# Patient Record
Sex: FEMALE | Race: OTHER | HISPANIC OR LATINO | Employment: UNEMPLOYED | URBAN - METROPOLITAN AREA
[De-identification: names, ages, dates, MRNs, and addresses within clinical notes are randomized per-mention and may not be internally consistent; named-entity substitution may affect disease eponyms.]

---
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
## 2019-01-02 ENCOUNTER — EVALUATION (OUTPATIENT)
Dept: SPEECH THERAPY | Facility: CLINIC | Age: 19
End: 2019-01-02
Payer: COMMERCIAL

## 2019-01-02 ENCOUNTER — EVALUATION (OUTPATIENT)
Dept: OCCUPATIONAL THERAPY | Facility: CLINIC | Age: 19
End: 2019-01-02
Payer: COMMERCIAL

## 2019-01-02 DIAGNOSIS — S06.0X0D CONCUSSION WITHOUT LOSS OF CONSCIOUSNESS, SUBSEQUENT ENCOUNTER: Primary | ICD-10-CM

## 2019-01-02 DIAGNOSIS — S06.0X0D CONCUSSION WITHOUT LOSS OF CONSCIOUSNESS, SUBSEQUENT ENCOUNTER: ICD-10-CM

## 2019-01-02 DIAGNOSIS — R48.8 OTHER SYMBOLIC DYSFUNCTIONS: Primary | ICD-10-CM

## 2019-01-02 DIAGNOSIS — R41.3 AMNESIA/MEMORY DISORDER: ICD-10-CM

## 2019-01-02 DIAGNOSIS — H53.8 BLURRED VISION: ICD-10-CM

## 2019-01-02 PROCEDURE — G8990 OTHER PT/OT CURRENT STATUS: HCPCS

## 2019-01-02 PROCEDURE — 96125 COGNITIVE TEST BY HC PRO: CPT | Performed by: SPEECH-LANGUAGE PATHOLOGIST

## 2019-01-02 PROCEDURE — 97166 OT EVAL MOD COMPLEX 45 MIN: CPT

## 2019-01-02 PROCEDURE — G9163 LANG EXPRESS GOAL STATUS: HCPCS | Performed by: SPEECH-LANGUAGE PATHOLOGIST

## 2019-01-02 PROCEDURE — G8991 OTHER PT/OT GOAL STATUS: HCPCS

## 2019-01-02 PROCEDURE — G9162 LANG EXPRESS CURRENT STATUS: HCPCS | Performed by: SPEECH-LANGUAGE PATHOLOGIST

## 2019-01-08 ENCOUNTER — APPOINTMENT (OUTPATIENT)
Dept: OCCUPATIONAL THERAPY | Facility: CLINIC | Age: 19
End: 2019-01-08
Payer: COMMERCIAL

## 2019-01-08 ENCOUNTER — APPOINTMENT (OUTPATIENT)
Dept: PHYSICAL THERAPY | Facility: CLINIC | Age: 19
End: 2019-01-08
Payer: COMMERCIAL

## 2019-01-10 ENCOUNTER — OFFICE VISIT (OUTPATIENT)
Dept: SPEECH THERAPY | Facility: CLINIC | Age: 19
End: 2019-01-10
Payer: COMMERCIAL

## 2019-01-10 ENCOUNTER — OFFICE VISIT (OUTPATIENT)
Dept: PHYSICAL THERAPY | Facility: CLINIC | Age: 19
End: 2019-01-10
Payer: COMMERCIAL

## 2019-01-10 DIAGNOSIS — S06.0X0D CONCUSSION WITHOUT LOSS OF CONSCIOUSNESS, SUBSEQUENT ENCOUNTER: Primary | ICD-10-CM

## 2019-01-10 DIAGNOSIS — S06.0X0D CONCUSSION WITHOUT LOSS OF CONSCIOUSNESS, SUBSEQUENT ENCOUNTER: ICD-10-CM

## 2019-01-10 DIAGNOSIS — R41.3 AMNESIA/MEMORY DISORDER: ICD-10-CM

## 2019-01-10 DIAGNOSIS — R48.8 OTHER SYMBOLIC DYSFUNCTIONS: Primary | ICD-10-CM

## 2019-01-10 PROCEDURE — 97112 NEUROMUSCULAR REEDUCATION: CPT | Performed by: PHYSICAL THERAPIST

## 2019-01-10 PROCEDURE — 97014 ELECTRIC STIMULATION THERAPY: CPT | Performed by: PHYSICAL THERAPIST

## 2019-01-10 PROCEDURE — 92507 TX SP LANG VOICE COMM INDIV: CPT

## 2019-01-10 PROCEDURE — 97110 THERAPEUTIC EXERCISES: CPT | Performed by: PHYSICAL THERAPIST

## 2019-01-10 NOTE — PROGRESS NOTES
Daily Speech Treatment Note    Today's date: 1/10/2019  Patients name: Spencer Morris  : 2000  MRN: [id number]  Safety measures: PCS  Referring provider: Jennifer Wiley PA-C    Primary Diagnosis/Billing code: B69 2  Secondary Diagnosis/ Billing code: R41 3, S06 0X0D    Visit Tracking:  -Referring provider: Epic  -Billing guidelines: AMA  -Visit #    Corry Curry  [facility]  (auth required after  visit)  -RE due 2019  Subjective/Behavioral:  -"My headache was a 10/10 all week  Right now I am a 9"    Patient arrived 15 min late to her speech appt  She missed her 4pm OT - she reports she did not know about that appt  Patient was provided with an updated schedule and the chance to r/s any appts that conflict with her job  Objective/Assessment:  -Reviewed testing results and goals in plan care with patient  Patient is in agreement at this time  Short-term goals:  1  Patient will be educated on word finding strategies (i e , circumlocution) for improved generative naming and verbal expression skills (to be achieved in 1-2 weeks)  2  Patient will name an average of 15+ items in a category in 60 seconds over 5 trials using compensatory strategies to facilitate improved word retrieval skills (to be achieved in 4-6 weeks)  3  Patient will name an average of 10+ words that begin with a specific letter in 60 seconds over 5 trials using compensatory strategies to facilitate improved word retrieval skills (to be achieved in 4-6 weeks)  4  Patient will complete word generation tasks (e g , synonyms/antonyms, analogies, category matrices, etc ) with 80% accuracy using word finding strategies to facilitate improved word retrieval skills (to be achieved in 4-6 weeks)  To target generative naming with initial letter and category provided, patient completed category members acitvity (i e , SPORT, begins with /g/)  Patient appropriately named 26/32 words; increasing to 32/32 with verbal cues      To target semantic association and lexicon building, patient asked to name an opposite/antonym and synonym of an abstract word (i e , rigid)  Opposite naming completed 18/21 opp, increased to 21/21 with verbal cues  Synonym naming completed 18/21 opp, increased to 20/21 with verbal cues  5  Patient will complete thought organization tasks (e g , sequencing, deduction puzzles, etc ) with 80% accuracy to facilitate increased executive functioning skills (to be achieved in 4-6 weeks)  Fill in blanks with recall: Patient was asked to fill in missing letters within a 10 word list of words within a category (i e , fruits; o_an_e (orange))  Patient completed ID of words in 16/20 opp  Patient was then educated on "chunking" memory strategy, and asked to use this strategy to study or memorize the two lists of words  Patient was provided with min-mod cues to create groups/chunking of words  Immediate recall completed in 14/20 opp  Plan:  -Continue with current plan of care

## 2019-01-10 NOTE — PROGRESS NOTES
Daily Note     Today's date: 1/10/2019  Patient name: Karen Todd  : 2000  MRN: [id number]  Referring provider: Odalys Durant PA-C  Dx:   Encounter Diagnosis     ICD-10-CM    1  Concussion without loss of consciousness, subsequent encounter S06 0X0D                   Subjective: Has 9/10 HA today  States that she has been experiencing 10/10 and nausea this past week, plans to take a medical leave from work  Objective: See treatment diary below    [employer] and roll tests neg B/L    Precautions none      Specialty Daily Treatment Diary      Manual   1/10            SOR 5 min                                                                          Exercise Diary   1/10           UT S  30" x 2            LS S  30" x 2            Chin tucks  5" 10x           Upright bike  L1, 6 min            VOR x 1  45" x 2 standing plain           VOR cx  45" x 2 standing plain           Ambulation with HT/HN  2 laps           FAEC - airex  30" x 2           Tandem gait  foam beams 2 laps                                                                                                                                                                           Modalities  1/10            Stim + MHP  10 min                                                  Assessment: Presented to PT session with high rating of HA which only decreased from 9/10 to 8/10 post modalities  HA continued to remain high throughout the session  Although pt had high HA and dizziness rating, pt may not be accurately rating sxs and appeared to tolerate session well  Reviewed and performed HEP with pt which pt reported good understanding  Due to c/o dizziness with supine<> sit, performed positional testing which was negative B/L  Patient would benefit from continued PT      Plan: Progress treatment as tolerated

## 2019-01-10 NOTE — PROGRESS NOTES
Daily Speech Treatment Note    Today's date: 1/10/2019 (***update)  Patients name: Sam Kwong  : 2000  MRN: [id number]  Safety measures: ***  Referring provider: Tessa Santana PA-C    Primary Diagnosis/Billing code: ***  Secondary Diagnosis/ Billing code: ***    Visit Tracking:  -Referring provider: Epic  -Billing guidelines: AMA  -Visit #  (***update)  -Auto  [facility]  (auth required after  visit)  -RE due 2019  Subjective/Behavioral:  -***    Objective/Assessment:  {ST note:56068}    Short-term goals:  1  Patient will be educated on word finding strategies (i e , circumlocution) for improved generative naming and verbal expression skills (to be achieved in 1-2 weeks)      2  Patient will name an average of 15+ items in a category in 60 seconds over 5 trials using compensatory strategies to facilitate improved word retrieval skills (to be achieved in 4-6 weeks)      3  Patient will name an average of 10+ words that begin with a specific letter in 60 seconds over 5 trials using compensatory strategies to facilitate improved word retrieval skills (to be achieved in 4-6 weeks)      4  Patient will complete word generation tasks (e g , synonyms/antonyms, analogies, category matrices, etc ) with 80% accuracy using word finding strategies to facilitate improved word retrieval skills (to be achieved in 4-6 weeks)      5  Patient will complete thought organization tasks (e g , sequencing, deduction puzzles, etc ) with 80% accuracy to facilitate increased executive functioning skills (to be achieved in 4-6 weeks)       Plan:  {RECOMMENDATIONS:33891}

## 2019-01-14 ENCOUNTER — OFFICE VISIT (OUTPATIENT)
Dept: PHYSICAL THERAPY | Facility: CLINIC | Age: 19
End: 2019-01-14
Payer: COMMERCIAL

## 2019-01-14 ENCOUNTER — APPOINTMENT (OUTPATIENT)
Dept: SPEECH THERAPY | Facility: CLINIC | Age: 19
End: 2019-01-14
Payer: COMMERCIAL

## 2019-01-14 ENCOUNTER — OFFICE VISIT (OUTPATIENT)
Dept: OCCUPATIONAL THERAPY | Facility: CLINIC | Age: 19
End: 2019-01-14
Payer: COMMERCIAL

## 2019-01-14 DIAGNOSIS — S06.0X0D CONCUSSION WITHOUT LOSS OF CONSCIOUSNESS, SUBSEQUENT ENCOUNTER: Primary | ICD-10-CM

## 2019-01-14 DIAGNOSIS — G43.709 CHRONIC MIGRAINE WITHOUT AURA WITHOUT STATUS MIGRAINOSUS, NOT INTRACTABLE: ICD-10-CM

## 2019-01-14 DIAGNOSIS — M54.2 NECK PAIN: ICD-10-CM

## 2019-01-14 DIAGNOSIS — M54.50 LOW BACK PAIN WITHOUT SCIATICA, UNSPECIFIED BACK PAIN LATERALITY, UNSPECIFIED CHRONICITY: ICD-10-CM

## 2019-01-14 DIAGNOSIS — R42 DIZZINESS AND GIDDINESS: ICD-10-CM

## 2019-01-14 PROCEDURE — 97112 NEUROMUSCULAR REEDUCATION: CPT | Performed by: PHYSICAL THERAPIST

## 2019-01-14 PROCEDURE — 97535 SELF CARE MNGMENT TRAINING: CPT

## 2019-01-14 PROCEDURE — 97110 THERAPEUTIC EXERCISES: CPT | Performed by: PHYSICAL THERAPIST

## 2019-01-14 NOTE — PROGRESS NOTES
Daily Note     Today's date: 2019  Patient name: Timothy Burnham  : 2000  MRN: [id number]  Referring provider: Radha Amador PA-C  Dx:   Encounter Diagnosis     ICD-10-CM    1  Concussion without loss of consciousness, subsequent encounter S06 0X0D    2  Dizziness and giddiness R42    3  Low back pain without sciatica, unspecified back pain laterality, unspecified chronicity M54 5    4  Neck pain M54 2    5  Chronic migraine without aura without status migrainosus, not intractable G43 709                   Subjective: Pt present with 7/10 HA today  Objective: See treatment diary below    Precautions none      Specialty Daily Treatment Diary      Manual   1/10  1/14          SOR 5 min   3 min                                                                        Exercise Diary   1/10  1/14         UT S  30" x 2            LS S  30" x 2            Chin tucks  5" 10x           Upright bike  L1, 6 min   L1, 10 min         VOR x 1  45" x 2 standing plain  45" x 2 standing plain         VOR cx  45" x 2 standing plain  45" x 2 standing plain         Ambulation with HT/HN  2 laps  2 laps         FAEC - airex  30" x 2  FTEC - airex 30" x 2         Tandem gait  foam beams 2 laps  foam beams 2 laps          Rockerboard   EO 30x A/P, M/L          Sidestepping   Foam beams 2 laps         Ambulation with turns    180 deg 2 laps ea                                                                                                                                Modalities  1/10  1/14          Stim + MHP  10 min             Stim + Ice   10 min                                     Assessment: Tolerated treatment fair  Able to tolerate full session but continues to report high HA and dizziness with all exercises  Most challenged by ambulation with HT/HN, demoing ataxic gait and often adducting LE in front of each other   Appeared hesitant and cautious while performing dynamic balance, appeared to require increased concentration for sequencing of movements pili during ambulation with 180 deg turns  Patient would benefit from continued PT      Plan: Progress treatment as tolerated

## 2019-01-14 NOTE — PROGRESS NOTES
Daily Note     Today's date: 2019  Patient name: Angelia Pressley  : 2000  MRN: [id number]  Referring provider: Roseanna Laughlin PA-C  Dx:   Encounter Diagnosis   Name Primary?  Concussion without loss of consciousness, subsequent encounter Yes                  Subjective: "I'm returning to work soon "      Objective: See treatment below  HA 8/10 upon arrival from PT  Dimmed [place] lights to reduce visual stimuli and applied MH to neck for HA reduction  Educated pt on use of blue occluder in home and work environments to reduce glare/improve screen tolerance  Multi-Matrix with completion of , visual closure, and visual clutter cards printed on blue paper, for improved saccades and visual scanning with visual clutter  3-letter word unscrambles requiring mental manipulation to sequence letters, for improved auditory processing  Upgraded to 4-letter unscrambles  HA increased to 9/10, so had pt sit in dark room with MH to neck for 5min to reduce HA  HA remained at 9/10 so ended session 10min early  Assessment: Tolerated treatment fair  100% accuracy  skills during Multi-Matrix with slow pace  100% accuracy sequencing 3-letters, 70% accuracy sequencing 4-letters and frequently requiring verbal cues to assist with sequencing  Plan: Continue skilled OT per POC        INTERVENTION COMMENTS:  Diagnosis: Concussion without loss of consciousness, subsequent encounter [S06 0X0D]  Precautions: N/A  FOTO: 14 with 86% limitation  Insurance: [address] [[id number]]  2 of 24 visits, PN due 2019

## 2019-01-15 DIAGNOSIS — G43.709 CHRONIC MIGRAINE WITHOUT AURA WITHOUT STATUS MIGRAINOSUS, NOT INTRACTABLE: ICD-10-CM

## 2019-01-15 DIAGNOSIS — S06.0X0D CONCUSSION WITHOUT LOSS OF CONSCIOUSNESS, SUBSEQUENT ENCOUNTER: ICD-10-CM

## 2019-01-15 RX ORDER — GABAPENTIN 300 MG/1
300 CAPSULE ORAL 3 TIMES DAILY
Qty: 60 CAPSULE | Refills: 0 | Status: SHIPPED | OUTPATIENT
Start: 2019-01-15 | End: 2019-03-14 | Stop reason: ALTCHOICE

## 2019-01-15 NOTE — TELEPHONE ENCOUNTER
Received fax from [address]  requesting gabapentin refill  Refill request entered  Pls sign off      Thanks

## 2019-01-17 ENCOUNTER — APPOINTMENT (OUTPATIENT)
Dept: SPEECH THERAPY | Facility: CLINIC | Age: 19
End: 2019-01-17
Payer: COMMERCIAL

## 2019-01-17 ENCOUNTER — TELEPHONE (OUTPATIENT)
Dept: NEUROLOGY | Facility: CLINIC | Age: 19
End: 2019-01-17

## 2019-01-17 ENCOUNTER — APPOINTMENT (OUTPATIENT)
Dept: PHYSICAL THERAPY | Facility: CLINIC | Age: 19
End: 2019-01-17
Payer: COMMERCIAL

## 2019-01-17 ENCOUNTER — APPOINTMENT (OUTPATIENT)
Dept: OCCUPATIONAL THERAPY | Facility: CLINIC | Age: 19
End: 2019-01-17
Payer: COMMERCIAL

## 2019-01-17 NOTE — PROGRESS NOTES
Daily Speech Treatment Note    Today's date: 2019  Patients name: Tori Olvera  : 2000  MRN: [id number]  Safety measures: PCS  Referring provider: Calvin Quintanilla PA-C    Primary Diagnosis/Billing code: P97 0  Secondary Diagnosis/ Billing code: R41 3, S06 0X0D    Visit Tracking:  -Referring provider: Epic  -Billing guidelines: AMA  -Visit #  ***  -Auto  [facility]  (auth required after  visit)  -RE due 2019  Subjective/Behavioral:  ***     Objective/Assessment:  -Reviewed patient's home exercises/activities completed since last appointment  ***    Short-term goals:  1  Patient will be educated on word finding strategies (i e , circumlocution) for improved generative naming and verbal expression skills (to be achieved in 1-2 weeks)  2  Patient will name an average of 15+ items in a category in 60 seconds over 5 trials using compensatory strategies to facilitate improved word retrieval skills (to be achieved in 4-6 weeks)  3  Patient will name an average of 10+ words that begin with a specific letter in 60 seconds over 5 trials using compensatory strategies to facilitate improved word retrieval skills (to be achieved in 4-6 weeks)  4  Patient will complete word generation tasks (e g , synonyms/antonyms, analogies, category matrices, etc ) with 80% accuracy using word finding strategies to facilitate improved word retrieval skills (to be achieved in 4-6 weeks)  5  Patient will complete thought organization tasks (e g , sequencing, deduction puzzles, etc ) with 80% accuracy to facilitate increased executive functioning skills (to be achieved in 4-6 weeks)  Plan:  -Continue with current plan of care
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
## 2019-01-21 ENCOUNTER — OFFICE VISIT (OUTPATIENT)
Dept: PHYSICAL THERAPY | Facility: CLINIC | Age: 19
End: 2019-01-21
Payer: COMMERCIAL

## 2019-01-21 ENCOUNTER — TELEPHONE (OUTPATIENT)
Dept: NEUROLOGY | Facility: CLINIC | Age: 19
End: 2019-01-21

## 2019-01-21 ENCOUNTER — OFFICE VISIT (OUTPATIENT)
Dept: SPEECH THERAPY | Facility: CLINIC | Age: 19
End: 2019-01-21
Payer: COMMERCIAL

## 2019-01-21 ENCOUNTER — OFFICE VISIT (OUTPATIENT)
Dept: OCCUPATIONAL THERAPY | Facility: CLINIC | Age: 19
End: 2019-01-21
Payer: COMMERCIAL

## 2019-01-21 DIAGNOSIS — S06.0X0D CONCUSSION WITHOUT LOSS OF CONSCIOUSNESS, SUBSEQUENT ENCOUNTER: Primary | ICD-10-CM

## 2019-01-21 DIAGNOSIS — M54.50 LOW BACK PAIN WITHOUT SCIATICA, UNSPECIFIED BACK PAIN LATERALITY, UNSPECIFIED CHRONICITY: ICD-10-CM

## 2019-01-21 DIAGNOSIS — M54.2 NECK PAIN: ICD-10-CM

## 2019-01-21 DIAGNOSIS — S06.0X0D CONCUSSION WITHOUT LOSS OF CONSCIOUSNESS, SUBSEQUENT ENCOUNTER: ICD-10-CM

## 2019-01-21 DIAGNOSIS — R48.8 OTHER SYMBOLIC DYSFUNCTIONS: Primary | ICD-10-CM

## 2019-01-21 DIAGNOSIS — R41.3 AMNESIA/MEMORY DISORDER: ICD-10-CM

## 2019-01-21 DIAGNOSIS — R42 DIZZINESS AND GIDDINESS: ICD-10-CM

## 2019-01-21 DIAGNOSIS — G43.709 CHRONIC MIGRAINE WITHOUT AURA WITHOUT STATUS MIGRAINOSUS, NOT INTRACTABLE: ICD-10-CM

## 2019-01-21 PROCEDURE — 97150 GROUP THERAPEUTIC PROCEDURES: CPT | Performed by: PHYSICAL THERAPIST

## 2019-01-21 PROCEDURE — 92507 TX SP LANG VOICE COMM INDIV: CPT | Performed by: SPEECH-LANGUAGE PATHOLOGIST

## 2019-01-21 PROCEDURE — 97535 SELF CARE MNGMENT TRAINING: CPT

## 2019-01-21 PROCEDURE — 97110 THERAPEUTIC EXERCISES: CPT | Performed by: PHYSICAL THERAPIST

## 2019-01-21 PROCEDURE — 97112 NEUROMUSCULAR REEDUCATION: CPT | Performed by: PHYSICAL THERAPIST

## 2019-01-21 NOTE — TELEPHONE ENCOUNTER
Received helen form  I lmom for pt to call office back to let her know about payment and turn around time  Fee would be $40  Form placed in clinical bin   Pt has an appt with gus 1/23
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
## 2019-01-22 ENCOUNTER — APPOINTMENT (OUTPATIENT)
Dept: OCCUPATIONAL THERAPY | Facility: CLINIC | Age: 19
End: 2019-01-22
Payer: COMMERCIAL

## 2019-01-22 ENCOUNTER — APPOINTMENT (OUTPATIENT)
Dept: PHYSICAL THERAPY | Facility: CLINIC | Age: 19
End: 2019-01-22
Payer: COMMERCIAL

## 2019-01-22 ENCOUNTER — APPOINTMENT (OUTPATIENT)
Dept: SPEECH THERAPY | Facility: CLINIC | Age: 19
End: 2019-01-22
Payer: COMMERCIAL

## 2019-01-22 NOTE — PROGRESS NOTES
Daily Speech Treatment Note    Today's date: 2019  Patients name: Palomo Pal  : 2000  MRN: [id number]  Safety measures: PCS  Referring provider: Gillian Leahy PA-C    Primary Diagnosis/Billing code: V14 4  Secondary Diagnosis/ Billing code: R41 3, S06 0X0D    Visit Tracking:  -Referring provider: Epic  -Billing guidelines: AMA  -Visit #3/24  ***  -Auto  [facility]  (auth required after  visit)  -RE due 2019  Subjective/Behavioral:  -***    Objective/Assessment:    Short-term goals:  1  Patient will be educated on word finding strategies (i e , circumlocution) for improved generative naming and verbal expression skills (to be achieved in 1-2 weeks)      2  Patient will name an average of 15+ items in a category in 60 seconds over 5 trials using compensatory strategies to facilitate improved word retrieval skills (to be achieved in 4-6 weeks)      3  Patient will name an average of 10+ words that begin with a specific letter in 60 seconds over 5 trials using compensatory strategies to facilitate improved word retrieval skills (to be achieved in 4-6 weeks)      4  Patient will complete word generation tasks (e g , synonyms/antonyms, analogies, category matrices, etc ) with 80% accuracy using word finding strategies to facilitate improved word retrieval skills (to be achieved in 4-6 weeks)  5  Patient will complete thought organization tasks (e g , sequencing, deduction puzzles, etc ) with 80% accuracy to facilitate increased executive functioning skills (to be achieved in 4-6 weeks)  Plan:  -Continue with current plan of care
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
## 2019-01-24 ENCOUNTER — OFFICE VISIT (OUTPATIENT)
Dept: NEUROLOGY | Facility: CLINIC | Age: 19
End: 2019-01-24
Payer: COMMERCIAL

## 2019-01-24 ENCOUNTER — OFFICE VISIT (OUTPATIENT)
Dept: PHYSICAL THERAPY | Facility: CLINIC | Age: 19
End: 2019-01-24
Payer: COMMERCIAL

## 2019-01-24 ENCOUNTER — OFFICE VISIT (OUTPATIENT)
Dept: SPEECH THERAPY | Facility: CLINIC | Age: 19
End: 2019-01-24
Payer: COMMERCIAL

## 2019-01-24 VITALS
DIASTOLIC BLOOD PRESSURE: 68 MMHG | SYSTOLIC BLOOD PRESSURE: 100 MMHG | BODY MASS INDEX: 31.54 KG/M2 | HEART RATE: 80 BPM | HEIGHT: 62 IN | WEIGHT: 171.4 LBS

## 2019-01-24 DIAGNOSIS — R51.9 WORSENING HEADACHES: Primary | ICD-10-CM

## 2019-01-24 DIAGNOSIS — S06.0X0D CONCUSSION WITHOUT LOSS OF CONSCIOUSNESS, SUBSEQUENT ENCOUNTER: Primary | ICD-10-CM

## 2019-01-24 DIAGNOSIS — R42 DIZZINESS AND GIDDINESS: ICD-10-CM

## 2019-01-24 DIAGNOSIS — G43.709 CHRONIC MIGRAINE WITHOUT AURA WITHOUT STATUS MIGRAINOSUS, NOT INTRACTABLE: ICD-10-CM
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
## 2019-01-24 DIAGNOSIS — R48.8 OTHER SYMBOLIC DYSFUNCTIONS: Primary | ICD-10-CM

## 2019-01-24 DIAGNOSIS — S06.0X0D CONCUSSION WITHOUT LOSS OF CONSCIOUSNESS, SUBSEQUENT ENCOUNTER: ICD-10-CM

## 2019-01-24 DIAGNOSIS — R41.3 AMNESIA/MEMORY DISORDER: ICD-10-CM

## 2019-01-24 PROBLEM — S06.0X0A CONCUSSION WITH NO LOSS OF CONSCIOUSNESS: Status: RESOLVED | Noted: 2018-05-28 | Resolved: 2019-01-24

## 2019-01-24 PROCEDURE — 97014 ELECTRIC STIMULATION THERAPY: CPT | Performed by: PHYSICAL THERAPIST

## 2019-01-24 PROCEDURE — 97112 NEUROMUSCULAR REEDUCATION: CPT | Performed by: PHYSICAL THERAPIST

## 2019-01-24 PROCEDURE — 99214 OFFICE O/P EST MOD 30 MIN: CPT | Performed by: PHYSICIAN ASSISTANT

## 2019-01-24 PROCEDURE — 97110 THERAPEUTIC EXERCISES: CPT | Performed by: PHYSICAL THERAPIST

## 2019-01-24 PROCEDURE — 92507 TX SP LANG VOICE COMM INDIV: CPT | Performed by: SPEECH-LANGUAGE PATHOLOGIST

## 2019-01-24 RX ORDER — PROCHLORPERAZINE MALEATE 10 MG
10 TABLET ORAL EVERY 6 HOURS PRN
Qty: 10 TABLET | Refills: 0 | Status: SHIPPED | OUTPATIENT
Start: 2019-01-24 | End: 2019-05-01

## 2019-01-24 RX ORDER — RIZATRIPTAN BENZOATE 10 MG/1
10 TABLET ORAL ONCE AS NEEDED
Qty: 9 TABLET | Refills: 0 | Status: SHIPPED | OUTPATIENT
Start: 2019-01-24 | End: 2019-03-14 | Stop reason: ALTCHOICE

## 2019-01-24 RX ORDER — CYPROHEPTADINE HYDROCHLORIDE 4 MG/1
4 TABLET ORAL
Qty: 30 TABLET | Refills: 0 | Status: SHIPPED | OUTPATIENT
Start: 2019-01-24 | End: 2019-03-14 | Stop reason: ALTCHOICE

## 2019-01-24 RX ORDER — OLANZAPINE 5 MG/1
5 TABLET ORAL
Qty: 5 TABLET | Refills: 0 | Status: SHIPPED | OUTPATIENT
Start: 2019-01-24 | End: 2019-03-14 | Stop reason: ALTCHOICE

## 2019-01-24 RX ORDER — DEXAMETHASONE 1 MG
1 TABLET ORAL
Qty: 5 TABLET | Refills: 0 | Status: SHIPPED | OUTPATIENT
Start: 2019-01-24 | End: 2019-01-28

## 2019-01-24 RX ORDER — LAMOTRIGINE 25 MG/1
TABLET ORAL
Qty: 120 TABLET | Refills: 3 | Status: SHIPPED | OUTPATIENT
Start: 2019-01-24 | End: 2019-03-14 | Stop reason: ALTCHOICE

## 2019-01-24 NOTE — ASSESSMENT & PLAN NOTE
Start Lamictal at bedtime  One tablet for 7 days, 2 tablets for 7 days, 3 tablets for 7 days and finally 4 tablets at bedtime  Continue gabapentin 300mg two times daily  At onset of migraine, take rizatriptan  May repeat in 2 hours if needed  Limit of 3 a week or 9 a month  If you take second dose of rizatriptan, take prochlorperazine 10 mg   Limit of 10 a month    Stay well hydrated, do not skip meals, and get plenty of rest     Continue with physical therapy for vestibular therapy  Occupational therapy can help with visual therapy which may help with the double vision  Speech therapy can be helpful for the memory issues  For next 5 days:   Take Dexamethasone 1 mg in am and Olanzapine at bedtime  For next 14 nights take cyproheptadine at bedtime then as needed for weather related headaches    Limit Ibuprofen to less than 3 doses a week  Please get MRI and blood work done

## 2019-01-24 NOTE — PATIENT INSTRUCTIONS
Start Lamictal at bedtime  One tablet for 7 days, 2 tablets for 7 days, 3 tablets for 7 days and finally 4 tablets at bedtime  Continue gabapentin 300mg two times daily  At onset of migraine, take rizatriptan  May repeat in 2 hours if needed  Limit of 3 a week or 9 a month  If you take second dose of rizatriptan, take prochlorperazine 10 mg   Limit of 10 a month    Stay well hydrated, do not skip meals, and get plenty of rest     Continue with physical therapy for vestibular therapy  Occupational therapy can help with visual therapy which may help with the double vision  Speech therapy can be helpful for the memory issues  For next 5 days: Take Dexamethasone 1 mg in am and Olanzapine at bedtime  For next 14 nights take cyproheptadine at bedtime then as needed for weather related headaches    Limit Ibuprofen to less than 3 doses a week  Please get MRI and blood work done    Neck pain:   - We discussed the role of her neck pathology and poor posture, with straightening of the normal cervical lordosis, in her headaches  We discussed how tightening of the neck muscles can irritate the nerves in the occipital region of her head and cause or worsen head pain  We also discussed and demonstrated neck strengthening and relaxation exercises, as well as giving written instructions on these exercises  - We talked about the importance of good posture for improving her shoulder, neck, and head pain  The patient was given visualization exercises for correcting posture, which she will practice at home  If this simple exercise does not help improve the posture, we will consider formal physical therapy in the future  Medication overuse headaches:   - We discussed medication overuse headache [facility]) and how to avoid it in the future  It was explained that all analgesics have the potential to cause medication overuse headache [facility]) and analgesic overuse can negate the effectiveness of headache preventive measures  After successful [address] treatment, preventive medications for an underlying primary headache disorder have a greater chance for success  Avoid medications with narcotics, barbiturates, or caffeine in them as these can cause rebound headaches after very few doses and can interfere with other headache medicine efficacy  Taking any analgesics for more than 2-3 days a week can cause medication overuse headache  Reproductive age women: Should take folic acid daily when taking anti-seizure drugs especially Depakote  Over-the-counter supplements: to decrease intensity and frequency of migraines  - Magnesium Oxide 400-500 mg a day  If any diarrhea or upset stomach, decrease dose  as tolerated  -  B2 200 mg twice a day  This supplement will change the color of the urine to fluorescent yellow no matter how hydrated, which is normal       Headache management instructions  - When patient has a moderate to severe headache, they should seek rest, initiate relaxation and apply cold compresses to the head  - Maintain regular sleep schedule  Adults need at least 7-8 hours of uninterrupted a night  - Limit over the counter medications such as Tylenol, Ibuprofen, Aleve, Excedrin  (No more than 3 times a week)  - Maintain headache diary  We discussed an DESTINY for a smart phone is "Migraine min"  - Limit caffeine to 1-2 cups 8 to 16 oz a day or less  - Avoid dietary trigger  (aged cheese, peanuts, MSG, aspartame and nitrates)  - Patient is to have regular frequent meals to prevent headache onset  - Please drink at least 64 ounces of water a day to help remain hydrated  Please call with any questions or concerns   Office number is [phone]

## 2019-01-24 NOTE — PROGRESS NOTES
Daily Speech Treatment Note    Today's date: 2019  Patients name: Jairo Szymanski  : 2000  MRN: [id number]  Safety measures: PCS  Referring provider: Felipe Klinefelter, PA-C    Primary Diagnosis/Billing code: P39 1  Secondary Diagnosis/ Billing code: R41 3, S06 0X0D    Visit Tracking:  -Referring provider: Epic  -Billing guidelines: AMA  -Visit #   -Auto  [facility]  (auth required after th visit)  -RE due 2019  Subjective/Behavioral:  -"I'm fine "    Patient f/u with neurology team this afternoon  Per patient report, she was started on a new medication for HAs, as well as recommended for MRI and blood work  Objective/Assessment:    Short-term goals:  1  Patient will be educated on word finding strategies (i e , circumlocution) for improved generative naming and verbal expression skills (to be achieved in 1-2 weeks)      2  Patient will name an average of 15+ items in a category in 60 seconds over 5 trials using compensatory strategies to facilitate improved word retrieval skills (to be achieved in 4-6 weeks)      3  Patient will name an average of 10+ words that begin with a specific letter in 60 seconds over 5 trials using compensatory strategies to facilitate improved word retrieval skills (to be achieved in 4-6 weeks)      4  Patient will complete word generation tasks (e g , synonyms/antonyms, analogies, category matrices, etc ) with 80% accuracy using word finding strategies to facilitate improved word retrieval skills (to be achieved in 4-6 weeks)  -Word finding: Patient presented with a list of words and asked to generate 3 synonyms for each word  Task completed in 48/66 opp (72%) independently, increasing to 59/66 opp (89% acc) with min semantic cues, increasing to 100% acc with additional min phonemic cues       5  Patient will complete thought organization tasks (e g , sequencing, deduction puzzles, etc ) with 80% accuracy to facilitate increased executive functioning skills (to be achieved in 4-6 weeks)  -Deductive reasoning/problem solving ([facility] 2, pg  273): Patient was presented with deductive reasoning puzzles and asked to solve  Puzzle #1 (3x4 grid) completed in 12/12 opp (100% acc) independently  -Deductive reasoning/problem solving ([facility] 2, pg  274): Patient was presented with deductive reasoning puzzles and asked to solve  Puzzle #2 (3x4 grid) completed in 3/12 opp (25% acc) independently, increasing to 100% acc with verbal cue to re-attempt puzzle  -Mental manipulation/organization: Clinician read 4 words aloud and patient asked to recall words in a sequential order (e g , [id number] = [id number])  Task completed in 10/18 opp (55%) independently, increasing to 14/18 opp (78% acc) with min verbal repetition cues  Plan:  -Patient was provided with home exercises/activities to target goals in plan of care at the end of today's session   -Continue with current plan of care  Patient was treated by Mala Louis, graduate SLP student, and was under the direct supervision of MADIHA Bee , CCC-SLP
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
## 2019-01-28 ENCOUNTER — APPOINTMENT (EMERGENCY)
Dept: RADIOLOGY | Facility: HOSPITAL | Age: 19
End: 2019-01-28
Payer: COMMERCIAL

## 2019-01-28 ENCOUNTER — HOSPITAL ENCOUNTER (EMERGENCY)
Facility: HOSPITAL | Age: 19
Discharge: HOME | End: 2019-01-28
Attending: EMERGENCY MEDICINE | Admitting: EMERGENCY MEDICINE
Payer: COMMERCIAL

## 2019-01-28 VITALS
HEART RATE: 89 BPM | OXYGEN SATURATION: 97 % | RESPIRATION RATE: 18 BRPM | SYSTOLIC BLOOD PRESSURE: 119 MMHG | DIASTOLIC BLOOD PRESSURE: 72 MMHG | TEMPERATURE: 98.4 F
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
## 2019-01-28 DIAGNOSIS — R51.9 ACUTE HEADACHE: ICD-10-CM

## 2019-01-28 DIAGNOSIS — S13.4XXA WHIPLASH: ICD-10-CM

## 2019-01-28 PROCEDURE — 99284 EMERGENCY DEPT VISIT MOD MDM: CPT

## 2019-01-28 PROCEDURE — 72040 X-RAY EXAM NECK SPINE 2-3 VW: CPT

## 2019-01-28 RX ORDER — ACETAMINOPHEN 500 MG
500 TABLET ORAL EVERY 6 HOURS PRN
Qty: 30 TABLET | Refills: 0 | Status: SHIPPED | OUTPATIENT
Start: 2019-01-28 | End: 2019-02-04

## 2019-01-28 RX ORDER — ACETAMINOPHEN 325 MG/1
975 TABLET ORAL ONCE AS NEEDED
Status: COMPLETED | OUTPATIENT
Start: 2019-01-28 | End: 2019-01-28

## 2019-01-28 RX ORDER — METHOCARBAMOL 500 MG/1
500 TABLET, FILM COATED ORAL 4 TIMES DAILY
Qty: 40 TABLET | Refills: 0 | Status: SHIPPED | OUTPATIENT
Start: 2019-01-28 | End: 2019-03-14 | Stop reason: ALTCHOICE

## 2019-01-28 RX ADMIN — ACETAMINOPHEN 975 MG: 325 TABLET, FILM COATED ORAL at 21:08

## 2019-01-29 NOTE — ED NOTES
Called x1 from [address] [facility] no answer        Oscar Antonio, MARLINE  01/28/19 8270

## 2019-01-29 NOTE — ED NOTES
Cervical collar discontinued per provider        [address] [place] [address], RN  01/28/19 6858

## 2019-01-29 NOTE — DISCHARGE INSTRUCTIONS
Tylenol as needed for pain  No sports or gym until you are cleared by your doctor/sports medicine  FU with your doctor in 1-3 days  Watch for sign of worsening head injury: vomiting, severe headache, somnolence, confusion, dizziness, if you see these signs return to the ED or return to your doctor sooner  Acute Headache, Ambulatory Care   GENERAL INFORMATION:   An acute headache  is pain or discomfort that starts suddenly and gets worse quickly  The cause of an acute headache may not be known  It may be triggered by stress, fatigue, hormones, food, or trauma  Common related symptoms include the following:   · Fever    · Sinus pressure    · Loss of memory    · Nausea or vomiting    · Problems with your vision, such as watery or red eyes, loss of vision, or pain in bright light    · Stiff neck    · Tenderness of the head and neck area    · Trouble staying awake, or being less alert than usual     · Weakness or less energy  Seek immediate care for the following symptoms:   · Severe pain    · A headache that occurs after a blow to the head, a fall, or other trauma     · Confusion or forgetfulness    · Numbness on one side of your face or body  Treatment for an acute headache  may include medicine to decrease pain  You may also need biofeedback or cognitive behavioral therapy  Ask your healthcare provider about these and other treatments for an acute headache  Manage my symptoms:   · Apply heat  on your head for 20 to 30 minutes every 2 hours for as many days as directed  Heat helps decrease pain and muscle spasms  You may alternate heat and ice  · Apply ice  on your head for 15 to 20 minutes every hour or as directed  Use an ice pack, or put crushed ice in a plastic bag  Cover it with a towel  Ice helps decrease pain  · Relax your muscles  Lie down in a comfortable position and close your eyes  Relax your muscles slowly  Start at your toes and work your way up your body      · Keep a record of your headaches  Write down when your headaches start and stop  Include your symptoms and what you were doing when the headache began  Record what you ate or drank for 24 hours before the headache started  Describe the pain and where it hurts  Keep track of what you did to treat your headache and whether it worked  Follow up with your healthcare provider as directed:  Bring your headache record with you when you see your healthcare provider  Write down your questions so you remember to ask them during your visits  CARE AGREEMENT:   You have the right to help plan your care  Learn about your health condition and how it may be treated  Discuss treatment options with your caregivers to decide what care you want to receive  You always have the right to refuse treatment  The above information is an  only  It is not intended as medical advice for individual conditions or treatments  Talk to your doctor, nurse or pharmacist before following any medical regimen to see if it is safe and effective for you  © 2014 [address] is for End User's use only and may not be sold, redistributed or otherwise used for commercial purposes  All illustrations and images included in CareNotes® are the copyrighted property of A D A M [employer] Inc  or [employer]  Cervical Sprain   WHAT YOU NEED TO KNOW:   A cervical sprain is a stretched or torn muscle or ligament in your neck  Ligaments are strong tissues that connect bones  Common causes of cervical sprains include a car accident, a fall, or a sports injury  DISCHARGE INSTRUCTIONS:   Seek care immediately if:   · You have pain or numbness from your shoulder down to your hand  · You have problems with your vision, hearing, or balance  · You feel confused or cannot concentrate  · You have problems with movement and strength  Contact your healthcare provider if:   · You have increased swelling or pain in your neck       · You have questions or concerns about your condition or care  Medicines: You may need any of the following:  · Acetaminophen  decreases pain and fever  It is available without a doctor's order  Ask how much to take and how often to take it  Follow directions  Read the labels of all other medicines you are using to see if they also contain acetaminophen, or ask your doctor or pharmacist  Acetaminophen can cause liver damage if not taken correctly  Do not use more than 4 grams (4,000 milligrams) total of acetaminophen in one day  · NSAIDs , such as ibuprofen, help decrease swelling, pain, and fever  This medicine is available with or without a doctor's order  NSAIDs can cause stomach bleeding or kidney problems in certain people  If you take blood thinner medicine, always ask your healthcare provider if NSAIDs are safe for you  Always read the medicine label and follow directions  · Muscle relaxers  help decrease pain and muscle spasms  · Prescription pain medicine  may be given  Ask your healthcare provider how to take this medicine safely  Some prescription pain medicines contain acetaminophen  Do not take other medicines that contain acetaminophen without talking to your healthcare provider  Too much acetaminophen may cause liver damage  Prescription pain medicine may cause constipation  Ask your healthcare provider how to prevent or treat constipation  · Take your medicine as directed  Contact your healthcare provider if you think your medicine is not helping or if you have side effects  Tell him or her if you are allergic to any medicine  Keep a list of the medicines, vitamins, and herbs you take  Include the amounts, and when and why you take them  Bring the list or the pill bottles to follow-up visits  Carry your medicine list with you in case of an emergency  Manage your symptoms:   · Apply heat  on your neck for 15 to 20 minutes, 4 to 6 times a day or as directed   Heat helps decrease pain, stiffness, and muscle spasms  · Begin gentle neck exercises  as soon as you can move your neck without pain  Exercises will help decrease stiffness and improve the strength and movement of your neck  Ask your healthcare provider what kind of exercises you should do  · Gradually return to your usual activities as directed  Stop if you have pain  Avoid activities that can cause more damage to your neck, such as heavy lifting or strenuous exercise  · Sleep without a pillow  to help decrease pain  Instead, roll a small towel tightly and place it under your neck  · Go to physical therapy as directed  A physical therapist teaches you exercises to help improve movement and strength, and to decrease pain  Prevent neck injury:   · Drive safely  Make sure everyone in your car wears a seatbelt  A seatbelt can save your life if you are in an accident  Do not use your cell phone when you are driving  This could distract you and cause an accident  Pull over if you need to make a call or send a text message  · Wear helmets, lifejackets, and protective gear  Always wear a helmet when you ride a bike or motorcycle, go skiing, or play sports that could cause a head injury  Wear protective equipment when you play sports  Wear a lifejacket when you are on a boat or doing water sports  Follow up with your healthcare provider as directed: You may be referred to an orthopedist or a physical therapist  Write down your questions so you remember to ask them during your visits  © 2017 [facility] Information is for End User's use only and may not be sold, redistributed or otherwise used for commercial purposes  All illustrations and images included in CareNotes® are the copyrighted property of [employer]  or [employer]  The above information is an  only  It is not intended as medical advice for individual conditions or treatments   Talk to your doctor, nurse or pharmacist before following any medical regimen to see if it is safe and effective for you  Motor Vehicle Accident   WHAT YOU NEED TO KNOW:   A motor vehicle accident (MVA) can cause injury from the impact or from being thrown around inside the car  You may have a bruise on your abdomen, chest, or neck from the seatbelt  You may also have pain in your face, neck, or back  You may have pain in your knee, hip, or thigh if your body hits the dash or the steering wheel  Muscle pain is commonly worse 1 to 2 days after an MVA  DISCHARGE INSTRUCTIONS:   Call 911 if:   · You have new or worsening chest pain or shortness of breath  Return to the emergency department if:   · You have new or worsening pain in your abdomen  · You have nausea and vomiting that does not get better  · You have a severe headache  · You have weakness, tingling, or numbness in your arms or legs  · You have new or worsening pain that makes it hard for you to move  Contact your healthcare provider if:   · You have pain that develops 2 to 3 days after the MVA  · You have questions or concerns about your condition or care  Medicines:   · Pain medicine: You may be given medicine to take away or decrease pain  Do not wait until the pain is severe before you take your medicine  · NSAIDs , such as ibuprofen, help decrease swelling, pain, and fever  This medicine is available with or without a doctor's order  NSAIDs can cause stomach bleeding or kidney problems in certain people  If you take blood thinner medicine, always ask if NSAIDs are safe for you  Always read the medicine label and follow directions  Do not give these medicines to children under 10months of age without direction from your child's healthcare provider  · Take your medicine as directed  Contact your healthcare provider if you think your medicine is not helping or if you have side effects  Tell him of her if you are allergic to any medicine   Keep a list of the medicines, vitamins, and herbs you take  Include the amounts, and when and why you take them  Bring the list or the pill bottles to follow-up visits  Carry your medicine list with you in case of an emergency  Follow up with your healthcare provider as directed:  Write down your questions so you remember to ask them during your visits  Safety tips:   · Always wear your seatbelt  This will help reduce serious injury from an MVA  · Use child safety seats  Your child needs to ride in a child safety seat made for his age, height, and weight  Ask your healthcare provider for more information about child safety seats  · Decrease speed  Drive the speed limit to reduce your risk for an MVA  · Do not drive if you are tired  You will react more slowly when you are tired  The slowed reaction time will increase your risk for an MVA  · Do not talk or text on your cell phone while you drive  You cannot respond fast enough in an emergency if you are distracted by texts or conversations  · Do not drink and drive  Use a designated   Call a taxi or get a ride home with someone if you have been drinking  Do not let your friends drive if they have been drinking alcohol  · Do not use illegal drugs and drive  You may be more tired or take risks that you normally would not take  Do not drive after you take prescription medicines that make you sleepy  Self-care:   · Use ice and heat  Ice helps decrease swelling and pain  Ice may also help prevent tissue damage  Use an ice pack, or put crushed ice in a plastic bag  Cover it with a towel and apply to your injured area for 15 to 20 minutes every hour, or as directed  After 2 days, use a heating pad on your injured area  Use heat as directed  · Gently stretch  Use gentle exercises to stretch your muscles after an MVA  Ask your healthcare provider for exercises you can do    © 2017 [employer] Information is for End User's use only and may not be sold, redistributed or otherwise used for commercial purposes  All illustrations and images included in CareNotes® are the copyrighted property of A [employer] M , Inc  or [employer]  The above information is an  only  It is not intended as medical advice for individual conditions or treatments  Talk to your doctor, nurse or pharmacist before following any medical regimen to see if it is safe and effective for you
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
## 2019-01-29 NOTE — ED NOTES
Found pt in waiting room with cervical collar removed  Re-applied cervical collar on pt       Vanessalcaus Navjot  01/28/19 2021

## 2019-01-30 ENCOUNTER — TELEPHONE (OUTPATIENT)
Dept: NEUROLOGY | Facility: CLINIC | Age: 19
End: 2019-01-30

## 2019-01-30 NOTE — PROGRESS NOTES
Daily Speech Treatment Note    Today's date: 2019 ***  Patients name: Angelia Pressley  : 2000  MRN: [id number]  Safety measures: PCS  Referring provider: Roseanna Laughlin PA-C    Primary Diagnosis/Billing code: E75 7  Secondary Diagnosis/ Billing code: R41 3, S06 0X0D    Visit Tracking:  -Referring provider: Epic  -Billing guidelines: AMA  -Visit #  ***  -[facility]  [facility]  (auth required after  visit)  -RE due 2019  Subjective/Behavioral:  ***  Objective/Assessment:    Short-term goals:  1  Patient will be educated on word finding strategies (i e , circumlocution) for improved generative naming and verbal expression skills (to be achieved in 1-2 weeks)      2  Patient will name an average of 15+ items in a category in 60 seconds over 5 trials using compensatory strategies to facilitate improved word retrieval skills (to be achieved in 4-6 weeks)  To target word retrieval skills, the patient participated in "Scattergories Categories" listing items in a category given different initial letters (e g  Things you wear - Devmelody Cheney)  Patient completed task in *** opp with ***% acc independently, increasing to ***/*** opp with ***% acc given semantic cues       3  Patient will name an average of 10+ words that begin with a specific letter in 60 seconds over 5 trials using compensatory strategies to facilitate improved word retrieval skills (to be achieved in 4-6 weeks)      4  Patient will complete word generation tasks (e g , synonyms/antonyms, analogies, category matrices, etc ) with 80% accuracy using word finding strategies to facilitate improved word retrieval skills (to be achieved in 4-6 weeks)  To target word finding strategies, the client completed an analogy activity ([employer] 5 9: 115) filling in the second half of the analogy  Patient completed task in ***/20 opp with ***% acc independently, increasing to ***/20 opp with ***% acc given semantic cues       5  Patient will complete thought organization tasks (e g , sequencing, deduction puzzles, etc ) with 80% accuracy to facilitate increased executive functioning skills (to be achieved in 4-6 weeks)  To target executive functioning skills, the patient completed a Word List Retention activity [facility] 10: Page 68) where the clinician read a list of five words and asked questions about their sequence (e g  What was the word after vest?)  Task completed in ***/10 opp with ***% acc independently, increasing to ***/10 opp with ***% accuracy after one repetition  To target executive functioning skills, the patient completed a Mental Manipulation activity [facility] 10: Page 175) where the clinician read a list of four words and asked the client to sequence them to make a sentence (e g  The, open, door, back)  Task completed in ***/20 opp with ***% acc independently, increasing to ***/20 opp with **% acc after one repetition  Plan:  -Patient was provided with home exercises/activities to target goals in plan of care at the end of today's session   -Continue with current plan of care  Patient was treated by Jaylon Seals, graduate SLP student, and was under the direct supervision of MADIHA Jackson , CCC-SLP
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
## 2019-01-30 NOTE — TELEPHONE ENCOUNTER
Please see encounter on 1/30/19 to f/u on forms  Pt was transferred to front to pay for forms  Offline nurse is working on forms

## 2019-01-30 NOTE — TELEPHONE ENCOUNTER
Pt scheduled with Phil sanches    Pt asking for a c/b from nurses asap, tried to call your line 3x could not get thru  Thanks!   Apolinar
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
## 2019-01-31 ENCOUNTER — TELEPHONE (OUTPATIENT)
Dept: NEUROLOGY | Facility: CLINIC | Age: 19
End: 2019-01-31

## 2019-01-31 ENCOUNTER — APPOINTMENT (OUTPATIENT)
Dept: SPEECH THERAPY | Facility: CLINIC | Age: 19
End: 2019-01-31
Payer: COMMERCIAL

## 2019-01-31 ENCOUNTER — APPOINTMENT (OUTPATIENT)
Dept: OCCUPATIONAL THERAPY | Facility: CLINIC | Age: 19
End: 2019-01-31
Payer: COMMERCIAL

## 2019-01-31 ENCOUNTER — APPOINTMENT (OUTPATIENT)
Dept: PHYSICAL THERAPY | Facility: CLINIC | Age: 19
End: 2019-01-31
Payer: COMMERCIAL

## 2019-01-31 NOTE — TELEPHONE ENCOUNTER
FYI: Pt did not return to work yet  Pt made aware that she needs to reschedule with Dr Nelly Mittal before forms can be completed  Appt rescheduled with Dr Nelly Mittal 3/26/19

## 2019-01-31 NOTE — TELEPHONE ENCOUNTER
Pt states that she missed her appt today and appt  rescheduled 3/26 w/ Dr Dayton Clemens Requesting sooner appt, if possible first week of Feb  Pt states that she needs fmla filled out asap  I did advised of the below again  Any other recommendation?           [phone]
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
## 2019-02-01 NOTE — TELEPHONE ENCOUNTER
pt called regarding her FMLA   she states that her job is requesting a letter stating why deadline needs to be extended  and estimated time when they may receive completed fmla as deadline for fmla completion 2/1       she did not return to work yet  she will be staying out until follow up with dr Veda Mabry unless we can't get her a sooner appt then  she would return sooner  she states that she can't be out of work that long  i was able to reschedule pt for monday 2/4 at 0930 with dr Veda Mabry  she is still requesting a letter     ok to generate letter?  can fax letter to   [phone]  [phone] call pt if ok to generate as she would also like to  a copy of letter

## 2019-02-01 NOTE — TELEPHONE ENCOUNTER
Ok to state she has appt with Dr Nelly Mittal on 2/4/19 and will be accessed at that time  2 week form turn around
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
## 2019-02-04 ENCOUNTER — OFFICE VISIT (OUTPATIENT)
Dept: NEUROLOGY | Facility: CLINIC | Age: 19
End: 2019-02-04
Payer: COMMERCIAL

## 2019-02-04 ENCOUNTER — OFFICE VISIT (OUTPATIENT)
Dept: SPEECH THERAPY | Facility: CLINIC | Age: 19
End: 2019-02-04
Payer: COMMERCIAL

## 2019-02-04 ENCOUNTER — OFFICE VISIT (OUTPATIENT)
Dept: PHYSICAL THERAPY | Facility: CLINIC | Age: 19
End: 2019-02-04
Payer: COMMERCIAL

## 2019-02-04 ENCOUNTER — TELEPHONE (OUTPATIENT)
Dept: NEUROLOGY | Facility: CLINIC | Age: 19
End: 2019-02-04

## 2019-02-04 ENCOUNTER — OFFICE VISIT (OUTPATIENT)
Dept: OCCUPATIONAL THERAPY | Facility: CLINIC | Age: 19
End: 2019-02-04
Payer: COMMERCIAL

## 2019-02-04 VITALS
HEART RATE: 84 BPM | SYSTOLIC BLOOD PRESSURE: 122 MMHG | DIASTOLIC BLOOD PRESSURE: 68 MMHG | WEIGHT: 175 LBS | BODY MASS INDEX: 32.2 KG/M2 | HEIGHT: 62 IN

## 2019-02-04 DIAGNOSIS — R48.8 OTHER SYMBOLIC DYSFUNCTIONS: Primary | ICD-10-CM

## 2019-02-04 DIAGNOSIS — S06.0X0D CONCUSSION WITHOUT LOSS OF CONSCIOUSNESS, SUBSEQUENT ENCOUNTER: Primary | ICD-10-CM

## 2019-02-04 DIAGNOSIS — G43.709 CHRONIC MIGRAINE WITHOUT AURA WITHOUT STATUS MIGRAINOSUS, NOT INTRACTABLE: ICD-10-CM

## 2019-02-04 DIAGNOSIS — G47.00 INSOMNIA, UNSPECIFIED TYPE: ICD-10-CM

## 2019-02-04 DIAGNOSIS — S06.0X0D CONCUSSION WITHOUT LOSS OF CONSCIOUSNESS, SUBSEQUENT ENCOUNTER: ICD-10-CM

## 2019-02-04 DIAGNOSIS — M54.50 LOW BACK PAIN WITHOUT SCIATICA, UNSPECIFIED BACK PAIN LATERALITY, UNSPECIFIED CHRONICITY: ICD-10-CM

## 2019-02-04 DIAGNOSIS — R41.3 MEMORY DIFFICULTY: ICD-10-CM

## 2019-02-04 DIAGNOSIS — R41.3 AMNESIA/MEMORY DISORDER: ICD-10-CM

## 2019-02-04 DIAGNOSIS — R51.9 CHRONIC DAILY HEADACHE: Primary | ICD-10-CM

## 2019-02-04 DIAGNOSIS — R42 DIZZINESS AND GIDDINESS: ICD-10-CM

## 2019-02-04 DIAGNOSIS — M54.2 NECK PAIN: ICD-10-CM

## 2019-02-04 DIAGNOSIS — F32.1 CURRENT MODERATE EPISODE OF MAJOR DEPRESSIVE DISORDER WITHOUT PRIOR EPISODE (HCC): ICD-10-CM

## 2019-02-04 PROCEDURE — 99354 PR PROLONGED SVC OUTPATIENT SETTING 1ST HOUR: CPT | Performed by: PSYCHIATRY & NEUROLOGY

## 2019-02-04 PROCEDURE — 99215 OFFICE O/P EST HI 40 MIN: CPT | Performed by: PSYCHIATRY & NEUROLOGY

## 2019-02-04 PROCEDURE — 92507 TX SP LANG VOICE COMM INDIV: CPT | Performed by: SPEECH-LANGUAGE PATHOLOGIST

## 2019-02-04 PROCEDURE — 97530 THERAPEUTIC ACTIVITIES: CPT

## 2019-02-04 PROCEDURE — 97014 ELECTRIC STIMULATION THERAPY: CPT | Performed by: PHYSICAL THERAPIST

## 2019-02-04 PROCEDURE — 97112 NEUROMUSCULAR REEDUCATION: CPT | Performed by: PHYSICAL THERAPIST

## 2019-02-04 PROCEDURE — 97150 GROUP THERAPEUTIC PROCEDURES: CPT | Performed by: PHYSICAL THERAPIST

## 2019-02-04 NOTE — TELEPHONE ENCOUNTER
MD Radha Hook             Could you please help her find a therapist/pyschologist that does Cognitive behavioral therapy covered by her insurance?
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
## 2019-02-04 NOTE — PROGRESS NOTES
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 as fund of knowledge are appropriate for age - except for during the test below otherwise was normal  Normal language and spontaneous speech  STANDARD ASSESSMENT OF CONCUSSION (Slude Strand 82)     1  Orientation (1 point for each correct) Total 5 points   Score   What month is it? 1   What is the date today? 1   What is the day of the week? 1   What year is it? 1   What time is it right now? (within 1 hour is correct) 1     2  Immediate memory (1 point for each): 5 words, 3 trials - total 15 points  finger, amilcar, blanket, lemon, insect    Alternates lists:  candle, paper, sugar, sandwich, wagon  baby, monkey, perfume, sunset, iron  elbow, apple, carpet, saddle, bubble  Jacket, arrow, pepper, cotton, movie  Dollar, honey, mirror, saddle, anchor       Trial 1 Trial 2 Trial 3   Word 1 1 1 1   Word 2 1 1 1   Word 3 1 1 1   Word 4 0 1 1   Word 5 1 1 1       3  Concentration:     a  Digits Backwards (1 point for each): 3, 4, 5, 6 digit span - Total 5 points    [][id number],  [][id number],  [][id number]     Alternate list:  ,  [address] [place] [address],  [][id number],  [][id number]  [address],  [][id number],  [][id number],  [][id number]  [] 296, []9851,  [][id number],  [][id number]     b   Months in REVERSE order (1 point for entire sequence correct)  (Dec, Nov, Oct, Sept, Aug, July, June, May, April, March, Feb, Jan) []       4  Delayed recall 5 minutes later  (1 point for each of the 5 words) - Total 5 points    Word 1 [x]   Word 2 []   Word 3 []   Word 4 []   Word 5 []         TOTALS 2/4/2019      Orientation (of 5) 5    Immediate memory (of 15) 14    Concentration (of 5) 0    Delayed recall (of 5) 1    Total (max 30) 20        Cranial Nerves:  II-visual fields full  Fundi appear normal bilaterally  III, IV, VI-Pupils were equal, round, and reactive to light and accomodation  Extraocular movements were full and conjugate without nystagmus  convergence 6 cm, conjugate gaze, normal smooth pursuits, normal saccades   V-facial sensation symmetric  VII-facial expression symmetric, intact forehead wrinkle, strong eye closure, symmetric smile    VIII-hearing grossly intact bilaterally   IX, X-palate elevation symmetric, no dysarthria  XI-shoulder shrug strength intact    XII-tongue protrusion midline  Motor Exam: symmetric bulk and tone throughout, no pronator drift  Power/strength 5/5 bilateral upper and lower extremities, no atrophy, fasciculations or abnormal movements noted  Sensory: grossly intact light touch in all extremities  Reflexes: brachioradialis 2+, biceps 2+, knee 2+, ankle 2+ bilaterally  No ankle clonus  Coordination: Finger nose finger intact bilaterally, no apparent dysmetria, ataxia or tremor noted  Gait: steady casual and tandem gait  Romberg Negative  Pertinent lab results:   Was told to get blood work done but has not done it      Imaging:   MRI brain with and without contrast scheduled for 02/07/2019  Noncontrast head CT 05/21/2018: Unremarkable    Was there an alternative explanation for the symptoms?   [] Yes (comorbid conditions: migraine, exacerbation of current concussion, anxiety, ADHD, etc)  [] No (concussion is the only likely cause for the current symptoms) Review of Systems:   ROS obtained by medical assistant Personally reviewed and updated if indicated  Review of Systems   Constitutional: Positive for fatigue  HENT: Negative  Eyes: Positive for visual disturbance (light,blurred)  Respiratory: Negative  Cardiovascular: Negative  Gastrointestinal: Positive for nausea  Endocrine: Negative  Genitourinary: Negative  Musculoskeletal: Positive for back pain and neck pain  Skin: Negative  Allergic/Immunologic: Negative  Neurological: Positive for dizziness, tremors, light-headedness and headaches (daily)  Hematological: Negative  Psychiatric/Behavioral: Positive for sleep disturbance (less)  All other systems reviewed and are negative  I have spent 70 minutes with Patient  today in which greater than 50% of this time was spent in counseling/coordination of care regarding Prognosis, Risks and benefits of tx options, Intructions for management, Importance of tx compliance, Risk factor reductions and Impressions        Author:  Azul Rendon MD   Fellowship trained Concussion Specialist

## 2019-02-04 NOTE — LETTER
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 groomed  No dysmorphic features  HEENT:  Normocephalic atraumatic  No meningismus  Oropharynx is clear and moist  No oral mucosal lesions  Chest:  Respirations regular and unlabored  Cardiovascular:  Regular rate, intact distal pulses  Distal extremities warm without palpable edema or tenderness, no observed significant swelling  Musculoskeletal:  Full range of motion  (see below under neurologic exam for evaluation of motor function and gait)   Skin:  warm and dry, not diaphoretic  No apparent birthmarks or stigmata of neurocutaneous disease  Psychiatric:  Depressed mood and affect       Neurological Examination:     Mental status/cognitive function:   Orientated to time, place and person  Recent and remote memory intact  Attention span and concentration as well as fund of knowledge are appropriate for age - except for during the test below otherwise was normal  Normal language and spontaneous speech  STANDARD ASSESSMENT OF CONCUSSION (Cecil Strand 82)     1  Orientation (1 point for each correct) Total 5 points   Score   What month is it? 1   What is the date today?  1   What is the day of the week? 1   What year is it? 1   What time is it right now? (within 1 hour is correct) 1     2  Immediate memory (1 point for each): 5 words, 3 trials - total 15 points  finger, amilcar, blanket, lemon, insect    Alternates lists:  candle, paper, sugar, sandwich, wagon  baby, monkey, perfume, sunset, iron  elbow, apple, carpet, saddle, bubble  Jacket, arrow, pepper, cotton, movie  Dollar, honey, mirror, saddle, anchor       Trial 1 Trial 2 Trial 3   Word 1 1 1 1   Word 2 1 1 1   Word 3 1 1 1   Word 4 0 1 1   Word 5 1 1 1       3  Concentration:     a  Digits Backwards (1 point for each): 3, 4, 5, 6 digit span - Total 5 points    []1096,  []98111,  [][id number]     Alternate list:  ,  [address] [place] [address],  []80535,  [][id number]  [facility],  []5714,  []79296,  [][id number]  [] 675, []0767,  []53936,  [][id number]     b  Months in REVERSE order (1 point for entire sequence correct)  (Dec, Nov, Oct, Sept, Aug, July, June, May, April, March, Feb, Jan) []       4  Delayed recall 5 minutes later  (1 point for each of the 5 words) - Total 5 points    Word 1 [x]   Word 2 []   Word 3 []   Word 4 []   Word 5 []         TOTALS 2/4/2019      Orientation (of 5) 5    Immediate memory (of 15) 14    Concentration (of 5) 0    Delayed recall (of 5) 1    Total (max 30) 20        Cranial Nerves:  II-visual fields full  Fundi appear normal bilaterally  III, IV, VI-Pupils were equal, round, and reactive to light and accomodation  Extraocular movements were full and conjugate without nystagmus  convergence 6 cm, conjugate gaze, normal smooth pursuits, normal saccades   V-facial sensation symmetric  VII-facial expression symmetric, intact forehead wrinkle, strong eye closure, symmetric smile    VIII-hearing grossly intact bilaterally   IX, X-palate elevation symmetric, no dysarthria  XI-shoulder shrug strength intact    XII-tongue protrusion midline  Motor Exam: symmetric bulk and tone throughout, no pronator drift   Power/strength 5/5 bilateral upper and lower extremities, no atrophy, fasciculations or abnormal movements noted  Sensory: grossly intact light touch in all extremities  Reflexes: brachioradialis 2+, biceps 2+, knee 2+, ankle 2+ bilaterally  No ankle clonus  Coordination: Finger nose finger intact bilaterally, no apparent dysmetria, ataxia or tremor noted  Gait: steady casual and tandem gait  Romberg Negative  Pertinent lab results:   Was told to get blood work done but has not done it      Imaging:   MRI brain with and without contrast scheduled for 02/07/2019  Noncontrast head CT 05/21/2018: Unremarkable    Was there an alternative explanation for the symptoms? [] Yes (comorbid conditions: migraine, exacerbation of current concussion, anxiety, ADHD, etc)  [] No (concussion is the only likely cause for the current symptoms)        Review of Systems:   ROS obtained by medical assistant Personally reviewed and updated if indicated  Review of Systems   Constitutional: Positive for fatigue  HENT: Negative  Eyes: Positive for visual disturbance (light,blurred)  Respiratory: Negative  Cardiovascular: Negative  Gastrointestinal: Positive for nausea  Endocrine: Negative  Genitourinary: Negative  Musculoskeletal: Positive for back pain and neck pain  Skin: Negative  Allergic/Immunologic: Negative  Neurological: Positive for dizziness, tremors, light-headedness and headaches (daily)  Hematological: Negative  Psychiatric/Behavioral: Positive for sleep disturbance (less)  All other systems reviewed and are negative  I have spent 70 minutes with Patient  today in which greater than 50% of this time was spent in counseling/coordination of care regarding Prognosis, Risks and benefits of tx options, Intructions for management, Importance of tx compliance, Risk factor reductions and Impressions        Author:  Timur Bender MD   Fellowship trained Concussion Specialist

## 2019-02-04 NOTE — PROGRESS NOTES
Daily Note     Today's date: 2019  Patient name: Jose Dowling  : 2000  MRN: [id number]  Referring provider: Amanda Shea PA-C  Dx:   Encounter Diagnosis   Name Primary?  Concussion without loss of consciousness, subsequent encounter Yes                  Subjective: "I really need to work on my memory "      Objective: See treatment below  1  Convergence Insufficiency Symptom Survey (CISS): 43/60    2  Concussion Cognitive Checklist:  *Patient indicated that she is experiencing difficulties in the following areas:    · Memory: Remembering what people have told you, Learning new things, Remembering the date/day of the week and Keeping track of your appointments    · Attention: Easily distracted, Keeping your attention/concentrating on a task, Dividing your attention (i e , multi-tasking) and Losing your train of thought    · Processing: Following directions    · Executive Functions: Organizing your thoughts and Planning daily tasks    · Communication: Word finding in conversation, Expressing thoughts and ideas fluently and Expressing thoughts and ideas into writing    · Visual: Losing spot on the page when reading, Poor screen tolerance with electronics and Eye strain/fatigue when reading    · Emotional: Awareness or control of your emotions, Increased anxiety and Sleep changes    · Increased Sensitivities to: Lighting, Noise, Movement and Crowds or busy environments     3   Contextual Memory Test (CMT): Pt reports has noticed a change in  memory, rates her memory capacity at 25%, if studied 20 objects for 90 seconds would recall 12 of them, would have recalled 20 of them pre injury, frequently forgets things that happened the day before 75% of the time, forgets important details 75% of the time, frequently forgets things people have told her 75% of the time, frequently forgets things that happened a few minutes ago 25% of the time, wouldnot remember facts about this form a week from now     IMMEDIATE RECALL:     score falls  in mild deficit range    DELAYED RECALL:   score falls in moderate deficit range   RECOGNITION:  10/20 with 2 confabulations resulting in score      4  [place] Cognitive Assessment Version 8 1 (MoCA V8 1)  Visuospatial/executive functionin/5  Naming:  3/3  Memory: 1st trial:  , 2nd trial:    Attention/concentration: 2  List of letters:   Serial Seven Subtraction:  3/3 w/ 0 errors  Language/sentence repetition:    Language Fluency:    Abstract/Correlational Thinkin  Delayed Recall:  0  Orientation:     Memory Index Score: 8/15  MoCA V1 8 1 Raw Score:  , MIS:  8/15, indicative of mild neurocognitive impairments  Assessment: Tolerated treatment well  HA sustained at a 7/10 during assessments  Able to tolerate about 1 hour of screen time        Plan: Continued skilled OT per POC    INTERVENTION COMMENTS:  Diagnosis: Concussion without loss of consciousness, subsequent encounter [S06 0X0D]  Precautions: N/A  FOTO:  4 of 24 visits, PN due 3/4

## 2019-02-04 NOTE — PROGRESS NOTES
Patient ID: Jae Rojo is a 22 y o  female  Assessment/Plan:    No problem-specific Assessment & Plan notes found for this encounter  {Assess/PlanSmartLinks:31111}       Subjective:    HPI    {[facility] HPI texts:99307}    {Common ambulatory SmartLinks:19199}         Objective:    Blood pressure 122/68, pulse 84, height 5' 2" (1 575 m), weight 79 4 kg (175 lb), last menstrual period 01/21/2019  Physical Exam    Neurological Exam      ROS:    Review of Systems   Constitutional: Positive for fatigue  HENT: Negative  Eyes: Positive for visual disturbance (light,blurred)  Respiratory: Negative  Cardiovascular: Negative  Gastrointestinal: Positive for nausea  Endocrine: Negative  Genitourinary: Negative  Musculoskeletal: Positive for back pain and neck pain  Skin: Negative  Allergic/Immunologic: Negative  Neurological: Positive for dizziness, tremors, light-headedness and headaches (daily)  Hematological: Negative  Psychiatric/Behavioral: Positive for sleep disturbance (less)  All other systems reviewed and are negative

## 2019-02-04 NOTE — LETTER
February 4, 2019     Patient: Sam Kwong   YOB: 2000   Date of Visit: 2/4/2019       To Whom it May Concern:    Sam Kwong is under my professional care  She was seen in my office on 2/4/2019  She may return to work on 2/5/19  I would recommend she return gradually, working 3 days per week for the next two weeks and then full time thereafter starting 2/18/19  If you have any questions or concerns, please don't hesitate to call           Sincerely,          Jenni Pérez MD        CC: No Recipients

## 2019-02-04 NOTE — PROGRESS NOTES
Daily Note     Today's date: 2019  Patient name: Renée Bates  : 2000  MRN: [id number]  Referring provider: Angie Mark PA-C  Dx:   Encounter Diagnosis     ICD-10-CM    1  Concussion without loss of consciousness, subsequent encounter S06 0X0D    2  Dizziness and giddiness R42    3  Low back pain without sciatica, unspecified back pain laterality, unspecified chronicity M54 5    4  Neck pain M54 2    5  Chronic migraine without aura without status migrainosus, not intractable G43 709                   Subjective: Pt has 6/10 HA today         Objective: See treatment diary below    Precautions none      Specialty Daily Treatment Diary      Manual   1/10  1/14          SOR 5 min   3 min                                                                        Exercise Diary   1/10  1/14  1/21  1/24  2/   UT S  30" x 2     30" x 2  30s x2     D/c to HEP     LS S  30" x 2     30" x 2  30s x2     D/c to HEP     Chin tucks  5" 10x    5" 10x  5s hod 10x     Upright bike  L1, 6 min   L1, 10 min  L1, 10 min     L3, 5 min    VOR x 1  45" x 2 standing plain  45" x 2 standing plain  45" x 2 standing plain  45" x 2 standing plain  45" x 2 standing busy   VOR cx  45" x 2 standing plain  45" x 2 standing plain  45" x 2 standing plain  45" x 2 standing plain  45" x 2 standing plain   Ambulation with HT/HN  2 laps  2 laps  2 laps   2 laps ea  2 laps    FAEC - airex  30" x 2  FTEC - airex 30" x 2 FTEC - airex 30" x 2  FTEC - airex 30" x 2  FTEC - airex 30" x 2   Tandem gait  foam beams 2 laps  foam beams 2 laps  fw/bw foam beams 2 laps   fw/bw foam beams 2 laps       Rockerboard   EO 30x A/P, M/L          Sidestepping   Foam beams 2 laps         Ambulation with turns    180 deg 2 laps ea   180 deg 2 laps ea   180 deg 2 laps ea   180 deg 2 laps ea    Tandem stance         30" x 2    Self ball toss         2 laps fw                                                                                             Modalities  1/10  1/14  1/21  1/24  2/4    Stim + MHP  10 min     10 min   10 min  10 min     Stim + Ice   10 min                           5:00-5:25: self directed  5:25-5:35: group   5:35-6:00: one on one       Assessment: Tolerated treatment fair  Overall demo improved balance compared to previous sessions, appeared to be more challenged with HN than HT with ambulation  No significant exacerbation of HA with exercises today  Patient would benefit from continued PT      Plan: Progress treatment as tolerated  Re-eval within 1-2 visits

## 2019-02-05 NOTE — TELEPHONE ENCOUNTER
Writer attempted to contact patient to discuss in network providers  Unable to leave message due to mailbox being full  Writer looked into patients insurance  Printed list of available providers  Will place in mail for patient  Will attempt to call patient again

## 2019-02-06 ENCOUNTER — TRANSCRIBE ORDERS (OUTPATIENT)
Dept: LAB | Facility: CLINIC | Age: 19
End: 2019-02-06

## 2019-02-06 ENCOUNTER — APPOINTMENT (OUTPATIENT)
Dept: LAB | Facility: CLINIC | Age: 19
End: 2019-02-06
Payer: COMMERCIAL

## 2019-02-06 DIAGNOSIS — R51.9 WORSENING HEADACHES: ICD-10-CM

## 2019-02-06 DIAGNOSIS — R51.9 CHRONIC DAILY HEADACHE: ICD-10-CM

## 2019-02-06 LAB
ALBUMIN SERPL BCP-MCNC: 3.7 G/DL (ref 3.5–5)
ALP SERPL-CCNC: 88 U/L (ref 46–384)
ALT SERPL W P-5'-P-CCNC: 27 U/L (ref 12–78)
ANION GAP SERPL CALCULATED.3IONS-SCNC: 11 MMOL/L (ref 4–13)
AST SERPL W P-5'-P-CCNC: 13 U/L (ref 5–45)
BILIRUB SERPL-MCNC: 0.3 MG/DL (ref 0.2–1)
BUN SERPL-MCNC: 14 MG/DL (ref 5–25)
CALCIUM SERPL-MCNC: 8.8 MG/DL (ref 8.3–10.1)
CHLORIDE SERPL-SCNC: 106 MMOL/L (ref 100–108)
CO2 SERPL-SCNC: 25 MMOL/L (ref 21–32)
CREAT SERPL-MCNC: 0.84 MG/DL (ref 0.6–1.3)
GFR SERPL CREATININE-BSD FRML MDRD: 101 ML/MIN/1.73SQ M
GLUCOSE SERPL-MCNC: 78 MG/DL (ref 65–140)
POTASSIUM SERPL-SCNC: 4.2 MMOL/L (ref 3.5–5.3)
PROT SERPL-MCNC: 7.2 G/DL (ref 6.4–8.2)
SODIUM SERPL-SCNC: 142 MMOL/L (ref 136–145)
TSH SERPL DL<=0.05 MIU/L-ACNC: 1.8 UIU/ML (ref 0.46–3.98)
VIT B12 SERPL-MCNC: 387 PG/ML (ref 100–900)

## 2019-02-06 PROCEDURE — 36415 COLL VENOUS BLD VENIPUNCTURE: CPT

## 2019-02-06 PROCEDURE — 82607 VITAMIN B-12: CPT

## 2019-02-06 PROCEDURE — 80053 COMPREHEN METABOLIC PANEL: CPT

## 2019-02-06 PROCEDURE — 84443 ASSAY THYROID STIM HORMONE: CPT

## 2019-02-07 NOTE — TELEPHONE ENCOUNTER
Attempted to contact 3x  Unable to leave voicemails  Sent therapy resources to patient home  Mailed unable to reach letter

## 2019-02-08 ENCOUNTER — TELEPHONE (OUTPATIENT)
Dept: NEUROLOGY | Facility: CLINIC | Age: 19
End: 2019-02-08
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
## 2019-02-08 NOTE — TELEPHONE ENCOUNTER
Pt called in regarding her forms, states that she was still not able to go to work, at her last appt with Dr Hitesh Castro she was advised to return to work part time  She states that she was told to stop her medications so this caused more headaches, dizziness and nausea  She wants to restart her medications the gabapentin and compazine  States she is planning to back on Monday  Please advise, and please also advise how we should fill out these forms? These forms are in the clinical bin and filled out from 1/31, they will have to be updated  Pt did pay for forms  See previous encounters for further info

## 2019-02-11 ENCOUNTER — OFFICE VISIT (OUTPATIENT)
Dept: OCCUPATIONAL THERAPY | Facility: CLINIC | Age: 19
End: 2019-02-11
Payer: COMMERCIAL

## 2019-02-11 ENCOUNTER — EVALUATION (OUTPATIENT)
Dept: PHYSICAL THERAPY | Facility: CLINIC | Age: 19
End: 2019-02-11
Payer: COMMERCIAL

## 2019-02-11 ENCOUNTER — OFFICE VISIT (OUTPATIENT)
Dept: SPEECH THERAPY | Facility: CLINIC | Age: 19
End: 2019-02-11
Payer: COMMERCIAL

## 2019-02-11 DIAGNOSIS — G43.709 CHRONIC MIGRAINE WITHOUT AURA WITHOUT STATUS MIGRAINOSUS, NOT INTRACTABLE: ICD-10-CM

## 2019-02-11 DIAGNOSIS — R48.8 OTHER SYMBOLIC DYSFUNCTIONS: Primary | ICD-10-CM

## 2019-02-11 DIAGNOSIS — M54.2 NECK PAIN: ICD-10-CM

## 2019-02-11 DIAGNOSIS — R41.3 AMNESIA/MEMORY DISORDER: ICD-10-CM

## 2019-02-11 DIAGNOSIS — S06.0X0D CONCUSSION WITHOUT LOSS OF CONSCIOUSNESS, SUBSEQUENT ENCOUNTER: ICD-10-CM

## 2019-02-11 DIAGNOSIS — M54.50 LOW BACK PAIN WITHOUT SCIATICA, UNSPECIFIED BACK PAIN LATERALITY, UNSPECIFIED CHRONICITY: ICD-10-CM

## 2019-02-11 DIAGNOSIS — S06.0X0D CONCUSSION WITHOUT LOSS OF CONSCIOUSNESS, SUBSEQUENT ENCOUNTER: Primary | ICD-10-CM

## 2019-02-11 DIAGNOSIS — R42 DIZZINESS AND GIDDINESS: ICD-10-CM

## 2019-02-11 PROCEDURE — 97014 ELECTRIC STIMULATION THERAPY: CPT | Performed by: PHYSICAL THERAPIST

## 2019-02-11 PROCEDURE — 97150 GROUP THERAPEUTIC PROCEDURES: CPT | Performed by: PHYSICAL THERAPIST

## 2019-02-11 PROCEDURE — 97112 NEUROMUSCULAR REEDUCATION: CPT | Performed by: PHYSICAL THERAPIST

## 2019-02-11 PROCEDURE — 92507 TX SP LANG VOICE COMM INDIV: CPT

## 2019-02-11 PROCEDURE — 97530 THERAPEUTIC ACTIVITIES: CPT

## 2019-02-11 NOTE — PROGRESS NOTES
Daily Speech Treatment Note    Today's date: 2019  Patients name: Elvin Ivory  : 2000  MRN: [id number]  Safety measures: PCS  Referring provider: Michael Romero PA-C    Primary Diagnosis/Billing code: J94 8  Secondary Diagnosis/ Billing code: R41 3, S06 [id number]    Visit Tracking:  -Referring provider: Epic  -Billing guidelines: AMA  -Visit #    [facility]  (auth required after  visit)  -RE due 2019  Subjective/Behavioral:  -Patient arrived 15 minutes late to appointment  reported traffic (coming from [facility])  HA 7/10 upon arrival; reporting it has been consistent all day  Objective/Assessment:  -Patient did not return HEP worksheets to today's session  Short-term goals:  1  Patient will be educated on word finding strategies (i e , circumlocution) for improved generative naming and verbal expression skills (to be achieved in 1-2 weeks)      2  Patient will name an average of 15+ items in a category in 60 seconds over 5 trials using compensatory strategies to facilitate improved word retrieval skills (to be achieved in 4-6 weeks)      3  Patient will name an average of 10+ words that begin with a specific letter in 60 seconds over 5 trials using compensatory strategies to facilitate improved word retrieval skills (to be achieved in 4-6 weeks)      4  Patient will complete word generation tasks (e g , synonyms/antonyms, analogies, category matrices, etc ) with 80% accuracy using word finding strategies to facilitate improved word retrieval skills (to be achieved in 4-6 weeks)  Patient able to answer analogies to improve generative word finding through online XO1 game in  opp independently  5  Patient will complete thought organization tasks (e g , sequencing, deduction puzzles, etc ) with 80% accuracy to facilitate increased executive functioning skills (to be achieved in 4-6 weeks)     -Deductive reasoning/[employer] from [employer] for [employer] (Schedule Planning): Pt able to fill in schedule (completed over 2 puzzles) based on written clues with the following accuracy:  Puzzle 1--> 80% acc indep; increasing to 100% with min verbal semantic cues  Puzzle 2--> 100% independently; extended processing time  Noted to self-correct 2x  Plan:  -Patient was provided with home exercises/activities to target goals in plan of care at the end of today's session   -Continue with current plan of care
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
## 2019-02-12 NOTE — PROGRESS NOTES
Daily Note     Today's date: 2019  Patient name: Marjorie Biswas  : 2000  MRN: [id number]  Referring provider: Derek Steele PA-C  Dx:   Encounter Diagnosis   Name Primary?  Concussion without loss of consciousness, subsequent encounter Yes                  Subjective: "I have to drive home "      Objective: See treatment below  Arrived from PT with an 8/10 HA  Dimmed lights while completing clipper magazine # 3 on dark blue paper  Completed 5 letter word unscrambles with clutter tiles  Assessment: Tolerated treatment fair  HA increased to a 9/10  CORONADO ended session 15 minutes early as patient was driving herself home  Plan: Continued skilled OT per POC      INTERVENTION COMMENTS:  Diagnosis: Concussion without loss of consciousness, subsequent encounter [S06 0X0D]  Precautions: NA  FOTO:  5 of 24 visits, PN due 3/

## 2019-02-13 NOTE — TELEPHONE ENCOUNTER
My official recommendations were for her to return to work the day after I saw her which would have been 2/5/19 and she should have gone into her work and discussed the plan I wrote out for gradual return:  I gave her two options: either to do half days for 2 weeks or to do partial weeks for 2 weeks - and she chose a 3 day week for 2 weeks  Basically she only has one week left of that if she has not returned to work yet

## 2019-02-13 NOTE — TELEPHONE ENCOUNTER
Second set of forms received on [address], they are the same leave/disability forms that were provided previously for Radha to complete (see encounter 1/30/19)  I do not see any accomodation form  Call placed to patient to clarify if she has a different accomodation form or if she is requesting that the same form in clinical bin (and in media under 2/12/19) be completed with schedule restrictions

## 2019-02-13 NOTE — TELEPHONE ENCOUNTER
I won't be providing her any forms  I returned her to work at my visit  Dr Ignacio Torrez instructed her to return with decreased hours

## 2019-02-14 ENCOUNTER — APPOINTMENT (OUTPATIENT)
Dept: PHYSICAL THERAPY | Facility: CLINIC | Age: 19
End: 2019-02-14
Payer: COMMERCIAL

## 2019-02-14 ENCOUNTER — APPOINTMENT (OUTPATIENT)
Dept: SPEECH THERAPY | Facility: CLINIC | Age: 19
End: 2019-02-14
Payer: COMMERCIAL
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
## 2019-02-15 NOTE — TELEPHONE ENCOUNTER
Patient dropped off forms to be filled out  Charges were dropped and accepted  Patient provided her employee ID number - [id number]  She specified the following accommodations must be included on the form - First 2 weeks is part time (3 days per week)  After 2 weeks, Sathya Mooney will be back to a set schedule (8 AM to 3:30 PM) for her PT, OT and ST therapy  Scanned forms under media tab in patients chart

## 2019-02-15 NOTE — TELEPHONE ENCOUNTER
Pt called back and states that the accommodations form is a different form than what we have  the accommodations she is asking for is 3 days a week for 2 weeks and then returning full time  she states that she was not able to go to work on 2/5  after appt with dr Maty Donald as her migraines were to bad and she did not go to work at all this week because of her migraines and because her employer said she should not return until they receive accommodations form  she states that the letter that you wrote was not good enough for her employer and they would need accommodations form completed before she could return to work  [phone]  she is also asking that we add a set schedule(she would need 8a-4p), she normally works 10:30-6:30  since she will still be attending  PT/OT and ST     she will drop off accommodations form to [place] office today  I called HR direct at [phone] and spoke to cirs  She states that accommodations form must be completed  The letter would be back up  Please advise if you are willing to fill out accommodations form  And for original FMLA form what date are we keeping her out of work to

## 2019-02-18 ENCOUNTER — APPOINTMENT (OUTPATIENT)
Dept: SPEECH THERAPY | Facility: CLINIC | Age: 19
End: 2019-02-18
Payer: COMMERCIAL

## 2019-02-18 ENCOUNTER — APPOINTMENT (OUTPATIENT)
Dept: PHYSICAL THERAPY | Facility: CLINIC | Age: 19
End: 2019-02-18
Payer: COMMERCIAL

## 2019-02-18 ENCOUNTER — APPOINTMENT (OUTPATIENT)
Dept: OCCUPATIONAL THERAPY | Facility: CLINIC | Age: 19
End: 2019-02-18
Payer: COMMERCIAL
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
## 2019-02-18 NOTE — TELEPHONE ENCOUNTER
Patient calling to check if her FMLA forms have been completed  Informed her that they are not complete and we will call her once they are finished

## 2019-02-18 NOTE — TELEPHONE ENCOUNTER
Ok to fill out forms as requested, thank you! murmurs                    Capillary refill normal    ABD:       Inspection soft, nondistended, nontender or no masses                   Extrem:   Pulses present 2+                  Inspection Warm and well perfused, No cyanosis, No clubbing and No edema                                       Psych:    Mental Status consistent with expectations based upon mood                 Gross Exam Normal    A complete review of all systems was done with no positive findings                     IMPRESSION:  Premature birth, GE reflux disease, chronic and recurrent pulmonary aspiration syndrome, reactive airway disease/asthma, seasonal allergic rhinitis, possible obstructive sleep apnea, clinically doing well from pulmonary standpoint       PLAN : Reassurance, refills were given for Pulmicort, new Suzette LC nebulizer unit setup was given, review asthma action plan based on the symptoms at this time. Patient has already received influenza vaccination for the season. Recommended watching for any symptoms suggestive of obstructive sleep apnea with the snoring, restless sleep, nightmares, night terrors, sweating a lot in sleep, daytime consequences of behavioral problems, if indeed that is the case child will need sleep study and a neck x-ray looking for obstructive sleep apnea, we will see the patient back in follow-up in 4-5 months.

## 2019-02-19 ENCOUNTER — APPOINTMENT (OUTPATIENT)
Dept: PHYSICAL THERAPY | Facility: CLINIC | Age: 19
End: 2019-02-19
Payer: COMMERCIAL

## 2019-02-19 NOTE — TELEPHONE ENCOUNTER
Forms completed  Please abdias dr Destini Lopez review and advise on section 1, question 1 and 2 (when pt can return to work) and please complete question 3  Please have dr Destini Lopez sign both form and please fax    Please also contact pt once faxed

## 2019-02-19 NOTE — TELEPHONE ENCOUNTER
Pt called re: medical accomodation form  She would like to work 4 hours a day part time shift for 2 weeks instead of 3 days a week for 2 weeks  Pls update form if Dr Maria Teresa Cm is agreeable

## 2019-02-21 ENCOUNTER — APPOINTMENT (OUTPATIENT)
Dept: PHYSICAL THERAPY | Facility: CLINIC | Age: 19
End: 2019-02-21
Payer: COMMERCIAL

## 2019-02-21 ENCOUNTER — APPOINTMENT (OUTPATIENT)
Dept: SPEECH THERAPY | Facility: CLINIC | Age: 19
End: 2019-02-21
Payer: COMMERCIAL

## 2019-02-21 ENCOUNTER — APPOINTMENT (OUTPATIENT)
Dept: OCCUPATIONAL THERAPY | Facility: CLINIC | Age: 19
End: 2019-02-21
Payer: COMMERCIAL
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
## 2019-02-21 NOTE — TELEPHONE ENCOUNTER
Patient called in at this inquiring if forms have been faxed  I spoke with Mayra Negrete, updated forms signed by Dr Loyda Morales and Mayra Negrete will fax now and upload completed copies into patient's chart

## 2019-02-22 NOTE — PROGRESS NOTES
Speech-Language Pathology Re-Evaluation/Discharge    Today's date: 2019  Patients name: Analisa Guajardo  : 2000  MRN: [id number]  Safety measures: PCS  Referring provider: Art Funes PA-C    Subjective comments: "I got stuck in traffic " (Patient arrived 20 minutes late to session ) (HA pain at start of session: 7/10)    Patient's goal(s): "to get back to normal"    Assessments    The Repeatable Battery for the Assessment of Neuropsychological Status (RBANS) is a brief, individually-administered assessment which measures attention, language, visuospatial/constructional abilities, and immediate & delayed memory  The RBANS is intended for use with adolescents to adults, ages 15 to 80 years  The following results were obtained during the administration of the assessment  Form: A    Cognitive Domain/Subtest: Index Score: Percentile Rank: Classification: IE: Status:   IMMEDIATE MEMORY 73 4%ile Borderline 62 IMPROVEMENT        1  List Learning ()          2  Story Memory (10/24)           VISUOSPATIAL/  CONSTRUCTIONAL 93 32%ile Average 79 IMPROVEMENT        3  Figure Copy ()          4  Line Orientation ()           LANGUAGE 72 3%ile Borderline 75 DECLINE        5  Picture Naming (7/10)          6  Semantic Fluency ()           ATTENTION 84 14%ile Borderline 55 IMPROVEMENT        7  Digit Span ()          8  Coding ()           DELAYED MEMORY 68 2%ile Extremely Low 49 IMPROVEMENT        9  List Recall (5/10)          10  List Recognition ()          11  Story Recall ()          12  Figure Recall ()           Sum of Index Scores:  390  320 IMPROVEMENT   Total Score:  72      Percentile: 3%ile      Classification: Borderline          IE indicates the scores from the initial evaluation (2018)  Form: B      *Patient named 14 concrete category members (animals) in 60 sec (norm=15+)   -- BELOW AVERAGE (6 items named on IE)    *Patient named 10 abstract category members (words beginning with letter 'm') in 60 sec (norm=10+)  -- AVERAGE (5 named in IE)    Goals    Short-term goals:   1  Patient will be educated on word finding strategies (i e , circumlocution) for improved generative naming and verbal expression skills (to be achieved in 1-2 weeks)  -- MET    2  Patient will name an average of 15+ items in a category in 60 seconds over 5 trials using compensatory strategies to facilitate improved word retrieval skills (to be achieved in 4-6 weeks)  -- PARTIALLY MET    3  Patient will name an average of 10+ words that begin with a specific letter in 60 seconds over 5 trials using compensatory strategies to facilitate improved word retrieval skills (to be achieved in 4-6 weeks)  --  MET    4  Patient will complete word generation tasks (e g , synonyms/antonyms, analogies, category matrices, etc ) with 80% accuracy using word finding strategies to facilitate improved word retrieval skills (to be achieved in 4-6 weeks)  -- MET    5  Patient will complete thought organization tasks (e g , sequencing, deduction puzzles, etc ) with 80% accuracy to facilitate increased executive functioning skills (to be achieved in 4-6 weeks)  -- MET    Long-term goals:  1  Patient will demonstrate adequate verbal expression during conversation without breakdowns or word finding deficits (to be achieved by discharge)  -- MET    2  Patient will demonstrate cognitive-communication skills consistent with age and education given use of compensatory strategies when needed to resume baseline activities and responsibilities in home, community, and work/school settings (to be achieved by discharge)  -- PARTIALLY MET      Impressions/Recommendations    Impressions: As evidenced on standardized testing, patient presents with mild cognitive-linguistic deficits c/b reduced immediate & short-term memory, language, verbal fluency, and attention skills   Patient has not been consistent with attendance in outpatient skilled Speech Therapy services--she has only attended 5 treatment sessions (plus 1 evaluation and today's re-evaluation) since December 2018  It is suspected that patients psychosocial stressors are contributing to her cognitive-linguistic dysfunction  It is recommended that patient follow-up with a neuropsychologist to receive further evaluation and treatment  Taken from [address] [place] [address] Dr note on 02/04/2019: "Patient reported that she was seen by Dr Geovanna Low this morning  Per patient report, Dr Geovanna Low indicated that she believes patient's symptoms are related to depression  Patient stated that she doesn't feel like she has depression  Per patient report, a  is to f/u with her re: potential behavior health services in the area  This clinician provided patient behavioral health contact information again, which was originally presented to her on the initial evaluation (01/02/2019)  Patient reported that Dr Geovanna Low stated that patient "doesn't have to continue with therapy [PT/OT/ST]" if she doesn't want to  When prompted by clinician, patient indicated that she does find ST "helpful" in that it "activates [her] brain"  Clinician educated patient that her re-evaluation for ST is due next week (on/around 02/13/2019) "    Recommendations:  -Patient to be discharged from outpatient skilled Speech Therapy services: Patient to be discharged to an independent Home Exercise Program  Patient has achieved maximum potential     Visit Tracking:  -Referring provider: Epic  -Billing guidelines: AMA  -Visit #7/24   -Auto  [facility]   [place] required after 24th visit)

## 2019-02-25 ENCOUNTER — APPOINTMENT (OUTPATIENT)
Dept: PHYSICAL THERAPY | Facility: CLINIC | Age: 19
End: 2019-02-25
Payer: COMMERCIAL

## 2019-02-25 ENCOUNTER — EVALUATION (OUTPATIENT)
Dept: SPEECH THERAPY | Facility: CLINIC | Age: 19
End: 2019-02-25
Payer: COMMERCIAL

## 2019-02-25 ENCOUNTER — OFFICE VISIT (OUTPATIENT)
Dept: OCCUPATIONAL THERAPY | Facility: CLINIC | Age: 19
End: 2019-02-25
Payer: COMMERCIAL

## 2019-02-25 DIAGNOSIS — R41.3 AMNESIA/MEMORY DISORDER: ICD-10-CM

## 2019-02-25 DIAGNOSIS — R48.8 OTHER SYMBOLIC DYSFUNCTIONS: Primary | ICD-10-CM

## 2019-02-25 DIAGNOSIS — S06.0X0D CONCUSSION WITHOUT LOSS OF CONSCIOUSNESS, SUBSEQUENT ENCOUNTER: Primary | ICD-10-CM

## 2019-02-25 DIAGNOSIS — S06.0X0D CONCUSSION WITHOUT LOSS OF CONSCIOUSNESS, SUBSEQUENT ENCOUNTER: ICD-10-CM

## 2019-02-25 PROCEDURE — 97535 SELF CARE MNGMENT TRAINING: CPT

## 2019-02-25 PROCEDURE — 96125 COGNITIVE TEST BY HC PRO: CPT | Performed by: SPEECH-LANGUAGE PATHOLOGIST

## 2019-02-28 ENCOUNTER — APPOINTMENT (OUTPATIENT)
Dept: PHYSICAL THERAPY | Facility: CLINIC | Age: 19
End: 2019-02-28
Payer: COMMERCIAL

## 2019-02-28 ENCOUNTER — OFFICE VISIT (OUTPATIENT)
Dept: OCCUPATIONAL THERAPY | Facility: CLINIC | Age: 19
End: 2019-02-28
Payer: COMMERCIAL

## 2019-02-28 ENCOUNTER — APPOINTMENT (OUTPATIENT)
Dept: OCCUPATIONAL THERAPY | Facility: CLINIC | Age: 19
End: 2019-02-28
Payer: COMMERCIAL

## 2019-02-28 ENCOUNTER — APPOINTMENT (OUTPATIENT)
Dept: SPEECH THERAPY | Facility: CLINIC | Age: 19
End: 2019-02-28
Payer: COMMERCIAL

## 2019-02-28 DIAGNOSIS — S06.0X0D CONCUSSION WITHOUT LOSS OF CONSCIOUSNESS, SUBSEQUENT ENCOUNTER: Primary | ICD-10-CM

## 2019-02-28 PROCEDURE — 97535 SELF CARE MNGMENT TRAINING: CPT

## 2019-02-28 NOTE — PROGRESS NOTES
Daily Note     Today's date: 2019  Patient name: Analisa Guajardo  : 2000  MRN: [id number]  Referring provider: Art Funes PA-C  Dx:   Encounter Diagnosis   Name Primary?  Concussion without loss of consciousness, subsequent encounter Yes                  Subjective: "Staring at that picture made my headache go up "      Objective: See treatment below  HA 6/10 upon arrival  Pt alphabetized list of 50 states at [facility] for board-to-table copy and working memory  I Spy at [facility] for sustained near-point convergence  Memory tiles game for improved immediate/delayed recall, grading task up from 24 to 40 tiles  HA 7/10 post treatment  Assessment: Tolerated treatment well  Min vc's provided during I Spy to assist with locating items  Error x1 alphabetizing states with fast pace  Pt demo F recall during memory tiles game  Plan: Continue skilled OT per POC        INTERVENTION COMMENTS:  Diagnosis: Concussion without loss of consciousness, subsequent encounter [S06 0X0D]  Precautions: NA  FOTO:  1 NK 93 ORXGRA, PN due 3/4

## 2019-03-04 NOTE — TELEPHONE ENCOUNTER
Well in my opinion the restrictions should have started 2/5/19 when I originally told her to go back to work and she at that time agreed    But I will give her this one last chance  If she does not return to work 3/5/19 (they can start then), I am no longer changing any of my recommendations or documentation

## 2019-03-04 NOTE — TELEPHONE ENCOUNTER
Received call from Maricruz Gill at [place] wanting to clarify when her restrictions were to start-The paperwork was filled out on 2/21/19, were they to start that day? Pt states that she is to return to work 3/5/19  Pt has not yet returned to work, planning on returning tomorrow- shall the restrictions start then?     Lily Peralta [phone]

## 2019-03-08 ENCOUNTER — OFFICE VISIT (OUTPATIENT)
Dept: URGENT CARE | Age: 19
End: 2019-03-08
Payer: COMMERCIAL

## 2019-03-08 VITALS
SYSTOLIC BLOOD PRESSURE: 122 MMHG | HEART RATE: 86 BPM | RESPIRATION RATE: 18 BRPM | OXYGEN SATURATION: 100 % | TEMPERATURE: 98.7 F | DIASTOLIC BLOOD PRESSURE: 70 MMHG

## 2019-03-08 DIAGNOSIS — J01.90 ACUTE SINUSITIS, RECURRENCE NOT SPECIFIED, UNSPECIFIED LOCATION: Primary | ICD-10-CM

## 2019-03-08 LAB — S PYO AG THROAT QL: NEGATIVE

## 2019-03-08 PROCEDURE — 87430 STREP A AG IA: CPT | Performed by: FAMILY MEDICINE

## 2019-03-08 PROCEDURE — 99214 OFFICE O/P EST MOD 30 MIN: CPT | Performed by: FAMILY MEDICINE

## 2019-03-08 PROCEDURE — 99284 EMERGENCY DEPT VISIT MOD MDM: CPT | Performed by: FAMILY MEDICINE

## 2019-03-08 PROCEDURE — G0383 LEV 4 HOSP TYPE B ED VISIT: HCPCS | Performed by: FAMILY MEDICINE

## 2019-03-08 RX ORDER — AMOXICILLIN 875 MG/1
875 TABLET, COATED ORAL 2 TIMES DAILY
Qty: 20 TABLET | Refills: 0 | Status: SHIPPED | OUTPATIENT
Start: 2019-03-08 | End: 2019-03-18

## 2019-03-09 NOTE — PATIENT INSTRUCTIONS
Rapid strep is negative  Will treat for sinus infection at this time  Pain in the throat may be coming from postnasal drip  Flonase and allergy medicine can help with symptoms  Nasal saline flushes or Neti pot can also be helpful  Tylenol Motrin as needed for pain or fever  Over-the-counter cough and cold medicine as needed  Follow up with family doctor if no improvement  Go to the ER with any worsening symptoms

## 2019-03-09 NOTE — PROGRESS NOTES
[facility]        NAME: Tori Olvera is a 22 y o  female  : 2000    MRN: [id number]  DATE: 2019  TIME: 9:36 PM    Assessment and Plan   Acute sinusitis, recurrence not specified, unspecified location [J01 90]  1  Acute sinusitis, recurrence not specified, unspecified location  amoxicillin (AMOXIL) 875 mg tablet    POCT rapid strepA         Patient Instructions     Patient Instructions   Rapid strep is negative  Will treat for sinus infection at this time  Pain in the throat may be coming from postnasal drip  Flonase and allergy medicine can help with symptoms  Nasal saline flushes or Neti pot can also be helpful  Tylenol Motrin as needed for pain or fever  Over-the-counter cough and cold medicine as needed  Follow up with family doctor if no improvement  Go to the ER with any worsening symptoms  Chief Complaint     Chief Complaint   Patient presents with    Sore Throat     sore throat x1 week with HA  History of Present Illness   Tori Olvera presents to the clinic c/o    This is a 65-year-old female here today with complaints of sore throat and headache  She states she has had symptoms for about 1 week  She does have some sinus pressure and congestion  She also complains of some ear pressure  She denies cough at this time  She states she has had felt feverish but has not checked her temperature  Last time she felt feverish was 2 nights ago  She has not been blowing her nose and has not been looking at discharge  Review of Systems   Review of Systems   Constitutional: Positive for activity change, chills, fatigue and fever  HENT: Positive for congestion, ear pain, sinus pressure, sinus pain and sore throat  Respiratory: Negative for cough  Neurological: Negative  Psychiatric/Behavioral: Negative            Current Medications     Long-Term Medications   Medication Sig Dispense Refill    cyproheptadine (PERIACTIN) 4 mg tablet Take 1 tablet (4 mg total) by mouth daily at bedtime Take nightly for 14 days then as needed 30 tablet 0    gabapentin (NEURONTIN) 300 mg capsule Take 1 capsule (300 mg total) by mouth 3 (three) times a day (Patient taking differently: Take 300 mg by mouth 2 (two) times a day  ) 60 capsule 0    lamoTRIgine (LaMICtal) 25 mg tablet Take 1 tablet for 7 days  Increase to 2 tablets for 7 days  3 tablets for 7 days  4 tablets to make 100 mg 120 tablet 3    magnesium oxide (MAG-OX) 400 mg Take 1 tablet (400 mg total) by mouth daily 90 tablet 3    methocarbamol (ROBAXIN) 500 mg tablet Take 1 tablet (500 mg total) by mouth 4 (four) times a day for 10 days 40 tablet 0    OLANZapine (ZyPREXA) 5 mg tablet Take 1 tablet (5 mg total) by mouth daily at bedtime 5 tablet 0    prochlorperazine (COMPAZINE) 10 mg tablet Take 1 tablet (10 mg total) by mouth every 6 (six) hours as needed for nausea or vomiting 10 tablet 0    rizatriptan (MAXALT) 10 MG tablet Take 1 tablet (10 mg total) by mouth once as needed for migraine for up to 1 dose May repeat in 2 hours if needed 9 tablet 0       Current Allergies     Allergies as of 03/08/2019    (No Known Allergies)            The following portions of the patient's history were reviewed and updated as appropriate: allergies, current medications, past family history, past medical history, past social history, past surgical history and problem list     Objective   /70 (BP Location: Left arm, Patient Position: Sitting)   Pulse 86   Temp 98 7 °F (37 1 °C) (Temporal)   Resp 18   LMP 02/24/2019   SpO2 100%        Physical Exam     Physical Exam   Constitutional: She is oriented to person, place, and time  She appears well-developed and well-nourished  HENT:   Right Ear: Hearing and tympanic membrane normal    Left Ear: Tympanic membrane normal    Mouth/Throat: Posterior oropharyngeal erythema present  Posterior pharynx mildly erythemic  No tonsillar exudate tonsils 1/4  Tenderness to palpate over the maxillary sinuses  Pulmonary/Chest: Effort normal and breath sounds normal  No stridor  She has no wheezes  She has no rhonchi  Neurological: She is oriented to person, place, and time  Skin: Skin is warm and dry  Psychiatric: She has a normal mood and affect  Her behavior is normal    Nursing note and vitals reviewed

## 2019-03-12 ENCOUNTER — OFFICE VISIT (OUTPATIENT)
Dept: OCCUPATIONAL THERAPY | Facility: CLINIC | Age: 19
End: 2019-03-12
Payer: COMMERCIAL

## 2019-03-12 DIAGNOSIS — S06.0X0D CONCUSSION WITHOUT LOSS OF CONSCIOUSNESS, SUBSEQUENT ENCOUNTER: Primary | ICD-10-CM

## 2019-03-12 PROCEDURE — 97535 SELF CARE MNGMENT TRAINING: CPT

## 2019-03-12 NOTE — PROGRESS NOTES
Daily Note     Today's date: 3/12/2019  Patient name: Lydia Gilbert  : 2000  MRN: [id number]  Referring provider: Dilan Cosme PA-C  Dx:   Encounter Diagnosis   Name Primary?  Concussion without loss of consciousness, subsequent encounter Yes                  Subjective: "I come here straight from work, and sometimes I get out of work late "      Objective: See treatment below  Pt arrived 30min late due to leaving work late  HA 7/10 throughout session  Initiated Visual and Attention Organize The Hour for temporal awareness, divided attention, and oculomotor control  Sequenced 9 tasks, and completed  (Rush Hour card 35)  Assessment: Tolerated treatment well  Required use of solutions side of rush hour card to solve  Plan: Continue skilled OT per POC        INTERVENTION COMMENTS:  Diagnosis: Concussion without loss of consciousness, subsequent encounter [S06 0X0D]  Precautions: NA  FOTO:   AXHGFH, PN due 3/4

## 2019-03-14 ENCOUNTER — OFFICE VISIT (OUTPATIENT)
Dept: FAMILY MEDICINE CLINIC | Facility: CLINIC | Age: 19
End: 2019-03-14

## 2019-03-14 ENCOUNTER — OFFICE VISIT (OUTPATIENT)
Dept: OCCUPATIONAL THERAPY | Facility: CLINIC | Age: 19
End: 2019-03-14
Payer: COMMERCIAL

## 2019-03-14 VITALS
SYSTOLIC BLOOD PRESSURE: 120 MMHG | TEMPERATURE: 97 F | HEIGHT: 62 IN | RESPIRATION RATE: 18 BRPM | BODY MASS INDEX: 31.83 KG/M2 | WEIGHT: 173 LBS | HEART RATE: 80 BPM | DIASTOLIC BLOOD PRESSURE: 80 MMHG

## 2019-03-14 DIAGNOSIS — K59.00 CONSTIPATION, UNSPECIFIED CONSTIPATION TYPE: ICD-10-CM

## 2019-03-14 DIAGNOSIS — E66.09 CLASS 1 OBESITY DUE TO EXCESS CALORIES WITHOUT SERIOUS COMORBIDITY WITH BODY MASS INDEX (BMI) OF 31.0 TO 31.9 IN ADULT: Primary | ICD-10-CM

## 2019-03-14 DIAGNOSIS — S06.0X0D CONCUSSION WITHOUT LOSS OF CONSCIOUSNESS, SUBSEQUENT ENCOUNTER: Primary | ICD-10-CM

## 2019-03-14 DIAGNOSIS — N92.1 MENORRHAGIA WITH IRREGULAR CYCLE: ICD-10-CM

## 2019-03-14 PROCEDURE — 97535 SELF CARE MNGMENT TRAINING: CPT | Performed by: OCCUPATIONAL THERAPIST

## 2019-03-14 PROCEDURE — 99213 OFFICE O/P EST LOW 20 MIN: CPT | Performed by: FAMILY MEDICINE

## 2019-03-14 RX ORDER — IBUPROFEN 600 MG/1
600 TABLET ORAL EVERY 6 HOURS PRN
Qty: 60 TABLET | Refills: 0 | Status: SHIPPED | OUTPATIENT
Start: 2019-03-14 | End: 2019-04-14 | Stop reason: SDUPTHER

## 2019-03-14 RX ORDER — SENNOSIDES 8.6 MG
1 TABLET ORAL
Qty: 14 TABLET | Refills: 0 | Status: SHIPPED | OUTPATIENT
Start: 2019-03-14 | End: 2019-09-13

## 2019-03-14 RX ORDER — DOCUSATE SODIUM 100 MG/1
100 CAPSULE, LIQUID FILLED ORAL 2 TIMES DAILY
Qty: 60 CAPSULE | Refills: 1 | Status: SHIPPED | OUTPATIENT
Start: 2019-03-14 | End: 2019-09-13

## 2019-03-14 NOTE — PATIENT INSTRUCTIONS
Acanthosis Nigricans   WHAT YOU NEED TO KNOW:   What is acanthosis nigricans (AN)? AN is a condition that causes dark, velvety patches to form on your skin  The patches form on your body folds and creases  The most common areas affected are the back of your neck, armpit, and groin  Your fingers, mouth, nipple area, bellybutton, face, or elbows may also be affected  Your symptoms may be mild or severe  The patches may itch or have an odor  Symptoms develop slowly  Over time, your skin may become thick and leathery  What increases my risk for AN? AN is more common in adults with darker skin  AN may be a sign of insulin resistance  This means your body cannot use insulin correctly  Insulin resistance can lead to diabetes  The following can also increase your risk:  · Obesity, diabetes, or cancer    · Certain medicines, such as steroids or birth control pills    · A family history of AN    · Polycystic ovary syndrome (PCOS), hypothyroidism, or an adrenal gland problem  How is AN diagnosed? Your healthcare provider may be able to diagnose AN by looking at your skin  He or she will ask when your skin changes started, and if the changes have gotten worse over time  Tell him or her if you are taking any medicines  If you are a woman, tell him or her if you have PCOS or take birth control pills  The provider may also ask if anyone in your family has AN or diabetes  You may need any of the following to find the cause of AN:  · Blood tests  may be used to check for medical problems that can cause AN  · A blood glucose test or hemoglobin A1c test  may be used to check the amount of glucose (sugar) in your blood  These tests may show if you have diabetes  · An x-ray  may be used to find medical problems that can cause AN  How is AN treated? The skin patches may not need treatment if they do not cause symptoms  Patches often fade when the cause of AN is treated   Your healthcare provider may stop or change your medicine if it is causing AN  You may need to lose weight  You may also need any of the following:  · Topical cream or gel medicines  may be prescribed  These medicines may make the patches lighter or softer  You may also be given antibiotic cream or soap to use on your skin  · Laser therapy  may be used to treat thick skin  What can I do to manage or prevent AN?   · Reach or maintain a healthy weight  AN skin patches often clear up after weight loss  Your healthcare provider can help you create a safe weight loss plan if you are overweight  · Eat healthy foods  Healthy foods can help control your blood sugar levels  Examples of healthy foods include fruits, vegetables, whole-grain breads, low-fat dairy products, beans, lean meats, and fish  Your healthcare provider or dietitian can help you create a healthy meal plan  · Exercise regularly  Exercise can help control your blood sugar level  Exercise can also help you manage your weight or lose weight  Try to get at least 30 minutes of exercise every day on most days of the week, or as directed  · Use gentle skin care products  Some skin care products can irritate your skin and make AN worse  Ask your healthcare provider which skin care products you should use  He or she may also be able to prescribe or recommend soaps if the patches have an odor  When should I contact my healthcare provider? · You have new or worsening symptoms  · You have questions or concerns about your condition or care  CARE AGREEMENT:   You have the right to help plan your care  Learn about your health condition and how it may be treated  Discuss treatment options with your caregivers to decide what care you want to receive  You always have the right to refuse treatment  The above information is an  only  It is not intended as medical advice for individual conditions or treatments   Talk to your doctor, nurse or pharmacist before following any medical regimen to see if it is safe and effective for you  © 2017 [employer] Information is for End User's use only and may not be sold, redistributed or otherwise used for commercial purposes  All illustrations and images included in CareNotes® are the copyrighted property of A D [employer] M [employer]  or [employer]

## 2019-03-14 NOTE — ASSESSMENT & PLAN NOTE
Obesity with Acanthosis nigricans:  BMI of 31 64     1  HbA1c ( 6/2018: 5 4): order HbA1c  2  Education about diet, exercise  Encouraging healthy lifestyle

## 2019-03-14 NOTE — PROGRESS NOTES
Tank Arce 2000 female MRN: [id number]    Family Medicine Acute Visit    ASSESSMENT/PLAN   Problem List Items Addressed This Visit        Other    Obesity - Primary     Obesity with Acanthosis nigricans:  BMI of 31 64     1  HbA1c ( 6/2018: 5 4): order HbA1c  2  Education about diet, exercise  Encouraging healthy lifestyle  Relevant Orders    HEMOGLOBIN A1C W/ EAG ESTIMATION    Menorrhagia with irregular cycle     Menorrhagia with irregular cycle: Janis history of ovarian cyst  * not sexually active    1  Order  -CBC rule out anemia  - abdominal pelvic ultrasound/ and transvaginal  -monitor the ovarian cyst  2  Ibuprofen 600 mg q6hrs p r n   3  Considering OCP to help regulate menses:  Patient unsure at this time will follow up after the ultrasound  4  Instructions to go to the ED if abdominal pain worsens      US abd: ( 11/19/2015)  A discrete left ovary is difficult to fully appreciate, however, in the region of the left ovary there is a 5 1 cm complex cystic lesion  This could be a hemorrhagic cyst for which a follow-up pelvic ultrasound in 6 weeks would be recommended           Relevant Medications    ibuprofen (MOTRIN) 600 mg tablet    Multiple Vitamins-Minerals (MULTIVITAMIN GUMMIES ADULTS) CHEW    Other Relevant Orders    CBC and Platelet    US abdomen and pelvis with transvaginal    US abdomen and pelvis with transvaginal    Constipation     Constipation:  Bowel movement every 3-4 days:  Likely secondary to diet    1  Start Colace 100 mg b i d   - encourage increase water, fiber  2  Senna HS for two weeks  3   Monitor symptoms         Relevant Medications    docusate sodium (COLACE) 100 mg capsule    senna (SENOKOT) 8 6 mg            Future Appointments   Date Time Provider Surjit Nicholas   3/19/2019  4:00 PM CLIFF Elias BE OT [address]   3/20/2019  4:30 PM CLIFF Viramontes OT [address]   3/26/2019  4:00 PM CLIFF Viramontes OT [id number] [facility] 3/28/2019  5:00 PM Anntheodorenoemy Richards CLIFF BE OT [address]   4/18/2019  2:00 PM Silverio Suggs MD [facility]   4/23/2019  3:00 PM Adonay Bowie MD NEURO CTR [facility]          SUBJECTIVE  CC: Abdominal Pain (left side/constipation)      HPI:  Palomo Pal is a 22 y o  female who presents for acute visit:      1  Painful menses:  Abd pain: monthly episodes ( 2 weeks): usually one week before menses  - Pain: 7/10   Characteristic: aching pain, increases with movement and increases when BM, urinating  - Most of the time is : Left lower quadrant     Medication:  Ibuprofen 200mg BID QD   - heating pads dont help      Menses: at times 2x a month: for the past year   - lasting about 6-7 days each   - changes tampons 6-7x a day: about every 2 hours   - Not sexually active   - Have never tried OCP      Review of Systems   Constitutional: Negative for activity change, appetite change, fatigue and fever  HENT: Negative for congestion  Eyes: Negative for visual disturbance  Respiratory: Negative for cough, choking, shortness of breath and wheezing  Cardiovascular: Negative for chest pain, palpitations and leg swelling  Gastrointestinal: Positive for abdominal pain and constipation  Negative for abdominal distention, diarrhea, nausea and vomiting  Endocrine: Negative for polyuria  Genitourinary: Positive for menstrual problem and vaginal bleeding  Negative for decreased urine volume, difficulty urinating, dyspareunia, dysuria, enuresis, flank pain, frequency, pelvic pain, urgency, vaginal discharge and vaginal pain  Musculoskeletal: Negative for back pain  Skin: Negative for rash  Neurological: Negative for dizziness, tremors, seizures, syncope, facial asymmetry, speech difficulty, weakness, light-headedness, numbness and headaches  Psychiatric/Behavioral: Negative for confusion  The patient is not nervous/anxious              Historical Information   The patient history was reviewed and updated as follows:  Past Medical History:   Diagnosis Date    Depression     last assessed-9/5/2014    Finger injury     last assessed-8/16/2014    Myalgia     and myositis    No known health problems     Polyuria     last assessed-11/15/2013    Viral gastroenteritis     last assessed-3/4/2013         Past Surgical History:   Procedure Laterality Date    NO PAST SURGERIES       Family History   Problem Relation Age of Onset    Hypertension Mother     Cancer Maternal Grandmother     Cancer Maternal Grandfather       Social History   Social History     Substance and Sexual Activity   Alcohol Use No     Social History     Substance and Sexual Activity   Drug Use No     Social History     Tobacco Use   Smoking Status Never Smoker   Smokeless Tobacco Never Used       Medications:     Current Outpatient Medications:     amoxicillin (AMOXIL) 875 mg tablet, Take 1 tablet (875 mg total) by mouth 2 (two) times a day for 10 days, Disp: 20 tablet, Rfl: 0    docusate sodium (COLACE) 100 mg capsule, Take 1 capsule (100 mg total) by mouth 2 (two) times a day for 30 days, Disp: 60 capsule, Rfl: 1    ibuprofen (MOTRIN) 600 mg tablet, Take 1 tablet (600 mg total) by mouth every 6 (six) hours as needed for mild pain, moderate pain or headaches for up to 30 days, Disp: 60 tablet, Rfl: 0    magnesium oxide (MAG-OX) 400 mg, Take 1 tablet (400 mg total) by mouth daily (Patient not taking: Reported on 3/14/2019), Disp: 90 tablet, Rfl: 3    Multiple Vitamins-Minerals (MULTIVITAMIN GUMMIES ADULTS) CHEW, Chew 1 tablet daily for 90 days, Disp: 90 tablet, Rfl: 3    prochlorperazine (COMPAZINE) 10 mg tablet, Take 1 tablet (10 mg total) by mouth every 6 (six) hours as needed for nausea or vomiting (Patient not taking: Reported on 3/14/2019), Disp: 10 tablet, Rfl: 0    senna (SENOKOT) 8 6 mg, Take 1 tablet (8 6 mg total) by mouth daily at bedtime for 14 days, Disp: 14 tablet, Rfl: 0    No Known Allergies    OBJECTIVE  Vitals:   Vitals:    03/14/19 1635   BP: 120/80   Pulse: 80   Resp: 18   Temp: (!) 97 °F (36 1 °C)   Weight: 78 5 kg (173 lb)   Height: 5' 2" (1 575 m)         Physical Exam   Constitutional: She is oriented to person, place, and time  She appears well-developed and well-nourished  No distress  HENT:   Head: Normocephalic  Eyes: Pupils are equal, round, and reactive to light  Neck: Normal range of motion  Cardiovascular: Normal rate and regular rhythm  No murmur heard  Pulmonary/Chest: Effort normal and breath sounds normal    Abdominal: Soft  Bowel sounds are normal  She exhibits no distension and no mass  There is no tenderness  There is no rebound and no guarding  No hernia  Musculoskeletal: Normal range of motion  Neurological: She is alert and oriented to person, place, and time  Skin: Skin is warm  Capillary refill takes less than 2 seconds  She is not diaphoretic  Dark pigmentation:  Neck   Psychiatric: Thought content normal  Her mood appears not anxious  Her affect is not angry and not blunt  Her speech is not slurred  Rapid and pressured:  slow speech  She is slowed  She is not agitated, not aggressive, not hyperactive, not withdrawn, not actively hallucinating and not combative  She does not exhibit a depressed mood  She is attentive            Joelle Gruber MD, PGY-3  [facility]   3/14/2019

## 2019-03-14 NOTE — ASSESSMENT & PLAN NOTE
Constipation:  Bowel movement every 3-4 days:  Likely secondary to diet    1  Start Colace 100 mg b i d   - encourage increase water, fiber  2  Senna HS for two weeks  3   Monitor symptoms

## 2019-03-14 NOTE — ASSESSMENT & PLAN NOTE
Menorrhagia with irregular cycle: Janis history of ovarian cyst  * not sexually active    1  Order  -CBC rule out anemia  - abdominal pelvic ultrasound/ and transvaginal  -monitor the ovarian cyst  2  Ibuprofen 600 mg q6hrs p r n   3  Considering OCP to help regulate menses:  Patient unsure at this time will follow up after the ultrasound  4  Instructions to go to the ED if abdominal pain worsens      US abd: ( 11/19/2015)  A discrete left ovary is difficult to fully appreciate, however, in the region of the left ovary there is a 5 1 cm complex cystic lesion    This could be a hemorrhagic cyst for which a follow-up pelvic ultrasound in 6 weeks would be recommended

## 2019-03-18 ENCOUNTER — TELEPHONE (OUTPATIENT)
Dept: FAMILY MEDICINE CLINIC | Facility: CLINIC | Age: 19
End: 2019-03-18

## 2019-03-18 NOTE — TELEPHONE ENCOUNTER
Pt called and said she was seen by Dr Sujata Pham on 3/14/19, at that time she ordered an ultrasound of abdomen and pelvis w/ transvaginal, Dr Sujata Pham said she was going to create to orders one for transvaginal and the other for an abdomen Ultrasound due to pt not knowing what kind of ultrasound she wanted, but Dr Sujata Pham ordered 2 of the same Ultrasound, pt only wants to have the Ultrasound done on top not internal  Please call pt when done

## 2019-03-19 ENCOUNTER — APPOINTMENT (OUTPATIENT)
Dept: OCCUPATIONAL THERAPY | Facility: CLINIC | Age: 19
End: 2019-03-19
Payer: COMMERCIAL

## 2019-03-20 ENCOUNTER — OFFICE VISIT (OUTPATIENT)
Dept: OCCUPATIONAL THERAPY | Facility: CLINIC | Age: 19
End: 2019-03-20
Payer: COMMERCIAL

## 2019-03-20 DIAGNOSIS — S06.0X0D CONCUSSION WITHOUT LOSS OF CONSCIOUSNESS, SUBSEQUENT ENCOUNTER: Primary | ICD-10-CM

## 2019-03-20 PROCEDURE — 97150 GROUP THERAPEUTIC PROCEDURES: CPT

## 2019-03-20 PROCEDURE — 97535 SELF CARE MNGMENT TRAINING: CPT

## 2019-03-20 NOTE — PROGRESS NOTES
Daily Note     Today's date: 3/20/2019  Patient name: Veda Perry  : 2000  MRN: [id number]  Referring provider: Eduar Gleason PA-C  Dx:   Encounter Diagnosis   Name Primary?  Concussion without loss of consciousness, subsequent encounter Yes                  Subjective: "My headaches are still pretty high "      Objective: See treatment below  HA 4/10 upon arrival  Continued Visual & Attention Organize The Hour for temporal awareness, divided attention, and oculomotor control  Completed an additional 3/ tasks including Word Puzzles, Line Tangles, and Alternating Animal/Place Names  HA 6/10 post treatment  Assessment: Tolerated treatment well  Min errors with  during line tangles and with switching/dividing attention during animal/place names, requiring vc's to recognize and correct errors  Plan: Continue skilled OT per POC        INTERVENTION COMMENTS:  Diagnosis: Concussion without loss of consciousness, subsequent encounter [S06 0X0D]  Precautions: NA  FOTO:  52 SO 26 [id number], PN scheduled 3/28

## 2019-03-21 DIAGNOSIS — N92.1 MENORRHAGIA WITH IRREGULAR CYCLE: Primary | ICD-10-CM

## 2019-03-21 NOTE — TELEPHONE ENCOUNTER
Spoke with patient, scheduling told her she would need a new order without vaginal probe  Please enter new order

## 2019-03-26 ENCOUNTER — APPOINTMENT (OUTPATIENT)
Dept: OCCUPATIONAL THERAPY | Facility: CLINIC | Age: 19
End: 2019-03-26
Payer: COMMERCIAL

## 2019-03-27 ENCOUNTER — OFFICE VISIT (OUTPATIENT)
Dept: OCCUPATIONAL THERAPY | Facility: CLINIC | Age: 19
End: 2019-03-27
Payer: COMMERCIAL

## 2019-03-27 DIAGNOSIS — S06.0X0D CONCUSSION WITHOUT LOSS OF CONSCIOUSNESS, SUBSEQUENT ENCOUNTER: Primary | ICD-10-CM

## 2019-03-27 PROCEDURE — 97530 THERAPEUTIC ACTIVITIES: CPT

## 2019-03-27 PROCEDURE — 97150 GROUP THERAPEUTIC PROCEDURES: CPT

## 2019-03-27 NOTE — PROGRESS NOTES
Daily Note     Today's date: 3/27/2019  Patient name: Jose Dowling  : 2000  MRN: [id number]  Referring provider: Amanda Shea PA-C  Dx:   Encounter Diagnosis   Name Primary?  Concussion without loss of consciousness, subsequent encounter Yes       Subjective: "I had a hard time doing that activity at the window "      Objective: See treatment below  HA 3/10 upon arrival  Monocular followed by B/L peripheral taping for 5min each for strengthening and convergence while completing Visual & Attention Organize The Hour for temporal awareness, divided attention, and oculomotor control  HA 4/10 post treatment  Assessment: Tolerated treatment well  Pt c/o blurry vision during monocular taping that persisted throughout session, increasing during Last Shape Maze at window in stance on blue foam block  Plan: Continue per plan of care         INTERVENTION COMMENTS:  Diagnosis: Concussion without loss of consciousness, subsequent encounter [S06 0X0D]  Precautions: NA  FOTO:  11 of 24 visits, PN scheduled 3/28

## 2019-03-28 ENCOUNTER — APPOINTMENT (OUTPATIENT)
Dept: OCCUPATIONAL THERAPY | Facility: CLINIC | Age: 19
End: 2019-03-28
Payer: COMMERCIAL

## 2019-04-11 ENCOUNTER — EVALUATION (OUTPATIENT)
Dept: OCCUPATIONAL THERAPY | Facility: CLINIC | Age: 19
End: 2019-04-11
Payer: COMMERCIAL

## 2019-04-11 DIAGNOSIS — S06.0X0D CONCUSSION WITHOUT LOSS OF CONSCIOUSNESS, SUBSEQUENT ENCOUNTER: Primary | ICD-10-CM

## 2019-04-11 PROCEDURE — 97530 THERAPEUTIC ACTIVITIES: CPT

## 2019-04-14 DIAGNOSIS — N92.1 MENORRHAGIA WITH IRREGULAR CYCLE: ICD-10-CM

## 2019-04-15 RX ORDER — IBUPROFEN 600 MG/1
TABLET ORAL
Qty: 60 TABLET | Refills: 0 | Status: SHIPPED | OUTPATIENT
Start: 2019-04-15 | End: 2019-05-03 | Stop reason: SDUPTHER

## 2019-04-26 ENCOUNTER — APPOINTMENT (OUTPATIENT)
Dept: LAB | Age: 19
End: 2019-04-26
Payer: COMMERCIAL

## 2019-04-26 DIAGNOSIS — N92.1 MENORRHAGIA WITH IRREGULAR CYCLE: ICD-10-CM

## 2019-04-26 DIAGNOSIS — E66.09 CLASS 1 OBESITY DUE TO EXCESS CALORIES WITHOUT SERIOUS COMORBIDITY WITH BODY MASS INDEX (BMI) OF 31.0 TO 31.9 IN ADULT: ICD-10-CM

## 2019-04-26 LAB
ERYTHROCYTE [DISTWIDTH] IN BLOOD BY AUTOMATED COUNT: 13.4 % (ref 11.6–15.1)
EST. AVERAGE GLUCOSE BLD GHB EST-MCNC: 108 MG/DL
HBA1C MFR BLD: 5.4 % (ref 4.2–6.3)
HCT VFR BLD AUTO: 40.8 % (ref 34.8–46.1)
HGB BLD-MCNC: 12.5 G/DL (ref 11.5–15.4)
MCH RBC QN AUTO: 25.8 PG (ref 26.8–34.3)
MCHC RBC AUTO-ENTMCNC: 30.6 G/DL (ref 31.4–37.4)
MCV RBC AUTO: 84 FL (ref 82–98)
PLATELET # BLD AUTO: 270 THOUSANDS/UL (ref 149–390)
PMV BLD AUTO: 10.9 FL (ref 8.9–12.7)
RBC # BLD AUTO: 4.84 MILLION/UL (ref 3.81–5.12)
WBC # BLD AUTO: 4.9 THOUSAND/UL (ref 4.31–10.16)

## 2019-04-26 PROCEDURE — 85027 COMPLETE CBC AUTOMATED: CPT

## 2019-04-26 PROCEDURE — 36415 COLL VENOUS BLD VENIPUNCTURE: CPT

## 2019-04-26 PROCEDURE — 83036 HEMOGLOBIN GLYCOSYLATED A1C: CPT

## 2019-04-29 ENCOUNTER — TELEPHONE (OUTPATIENT)
Dept: FAMILY MEDICINE CLINIC | Facility: CLINIC | Age: 19
End: 2019-04-29

## 2019-05-01 ENCOUNTER — OFFICE VISIT (OUTPATIENT)
Dept: NEUROLOGY | Facility: CLINIC | Age: 19
End: 2019-05-01
Payer: COMMERCIAL

## 2019-05-01 VITALS
RESPIRATION RATE: 14 BRPM | HEIGHT: 62 IN | HEART RATE: 74 BPM | BODY MASS INDEX: 31.21 KG/M2 | DIASTOLIC BLOOD PRESSURE: 74 MMHG | WEIGHT: 169.6 LBS | SYSTOLIC BLOOD PRESSURE: 110 MMHG

## 2019-05-01 DIAGNOSIS — G47.00 INSOMNIA, UNSPECIFIED TYPE: ICD-10-CM
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
## 2019-05-01 DIAGNOSIS — S16.1XXD STRAIN OF NECK MUSCLE, SUBSEQUENT ENCOUNTER: ICD-10-CM

## 2019-05-01 DIAGNOSIS — F32.1 CURRENT MODERATE EPISODE OF MAJOR DEPRESSIVE DISORDER WITHOUT PRIOR EPISODE (HCC): ICD-10-CM

## 2019-05-01 DIAGNOSIS — M54.2 CERVICALGIA: ICD-10-CM

## 2019-05-01 DIAGNOSIS — G43.009 MIGRAINE WITHOUT AURA AND WITHOUT STATUS MIGRAINOSUS, NOT INTRACTABLE: ICD-10-CM

## 2019-05-01 DIAGNOSIS — R51.9 CHRONIC DAILY HEADACHE: Primary | ICD-10-CM

## 2019-05-01 PROCEDURE — 99215 OFFICE O/P EST HI 40 MIN: CPT | Performed by: PSYCHIATRY & NEUROLOGY

## 2019-05-01 RX ORDER — LANOLIN ALCOHOL/MO/W.PET/CERES
3 CREAM (GRAM) TOPICAL
Qty: 90 TABLET | Refills: 1 | Status: SHIPPED | OUTPATIENT
Start: 2019-05-01 | End: 2019-11-04

## 2019-05-01 RX ORDER — VENLAFAXINE HYDROCHLORIDE 37.5 MG/1
CAPSULE, EXTENDED RELEASE ORAL
Qty: 60 CAPSULE | Refills: 3 | Status: SHIPPED | OUTPATIENT
Start: 2019-05-01 | End: 2019-05-22 | Stop reason: SDUPTHER

## 2019-05-01 NOTE — TELEPHONE ENCOUNTER
MD Tanvir Schultz             Could you please help this patient with a list of therapist or psychologist covered by their insurance? Writer previously mailed patient these resources as well as she did not answer phone calls  Writer will re-send

## 2019-05-02 NOTE — TELEPHONE ENCOUNTER
placed list of in network providers in the mail for patient again   had previously mailed them out as well from last appointment  Patient needs to pick provider and contact them directly to schedule

## 2019-05-03 DIAGNOSIS — N92.1 MENORRHAGIA WITH IRREGULAR CYCLE: ICD-10-CM

## 2019-05-03 RX ORDER — IBUPROFEN 600 MG/1
TABLET ORAL
Qty: 60 TABLET | Refills: 0 | Status: SHIPPED | OUTPATIENT
Start: 2019-05-03 | End: 2019-05-26 | Stop reason: SDUPTHER

## 2019-05-22 DIAGNOSIS — R51.9 CHRONIC DAILY HEADACHE: ICD-10-CM

## 2019-05-22 DIAGNOSIS — F32.1 CURRENT MODERATE EPISODE OF MAJOR DEPRESSIVE DISORDER WITHOUT PRIOR EPISODE (HCC): ICD-10-CM

## 2019-05-22 DIAGNOSIS — G43.009 MIGRAINE WITHOUT AURA AND WITHOUT STATUS MIGRAINOSUS, NOT INTRACTABLE: ICD-10-CM

## 2019-05-22 RX ORDER — VENLAFAXINE HYDROCHLORIDE 75 MG/1
75 CAPSULE, EXTENDED RELEASE ORAL DAILY
Qty: 30 CAPSULE | Refills: 3 | Status: SHIPPED | OUTPATIENT
Start: 2019-05-22 | End: 2019-07-01

## 2019-05-26 DIAGNOSIS — N92.1 MENORRHAGIA WITH IRREGULAR CYCLE: ICD-10-CM

## 2019-05-27 RX ORDER — IBUPROFEN 600 MG/1
TABLET ORAL
Qty: 60 TABLET | Refills: 0 | Status: SHIPPED | OUTPATIENT
Start: 2019-05-27 | End: 2019-06-07 | Stop reason: SDUPTHER

## 2019-06-07 ENCOUNTER — OFFICE VISIT (OUTPATIENT)
Dept: FAMILY MEDICINE CLINIC | Facility: CLINIC | Age: 19
End: 2019-06-07

## 2019-06-07 VITALS
BODY MASS INDEX: 29.96 KG/M2 | TEMPERATURE: 97.4 F | DIASTOLIC BLOOD PRESSURE: 70 MMHG | WEIGHT: 162.8 LBS | RESPIRATION RATE: 16 BRPM | HEART RATE: 68 BPM | SYSTOLIC BLOOD PRESSURE: 100 MMHG | HEIGHT: 62 IN

## 2019-06-07 DIAGNOSIS — Z11.3 SCREENING FOR STD (SEXUALLY TRANSMITTED DISEASE): ICD-10-CM

## 2019-06-07 DIAGNOSIS — R10.32 LEFT LOWER QUADRANT PAIN: Primary | ICD-10-CM

## 2019-06-07 DIAGNOSIS — N92.1 MENORRHAGIA WITH IRREGULAR CYCLE: ICD-10-CM

## 2019-06-07 PROCEDURE — 99213 OFFICE O/P EST LOW 20 MIN: CPT | Performed by: FAMILY MEDICINE

## 2019-06-07 RX ORDER — IBUPROFEN 600 MG/1
TABLET ORAL
Qty: 60 TABLET | Refills: 0 | Status: SHIPPED | OUTPATIENT
Start: 2019-06-07 | End: 2019-07-03 | Stop reason: SDUPTHER

## 2019-06-10 ENCOUNTER — TELEPHONE (OUTPATIENT)
Dept: NEUROLOGY | Facility: CLINIC | Age: 19
End: 2019-06-10

## 2019-06-10 DIAGNOSIS — G43.719 INTRACTABLE CHRONIC MIGRAINE WITHOUT AURA AND WITHOUT STATUS MIGRAINOSUS: Primary | ICD-10-CM

## 2019-06-10 DIAGNOSIS — R51.9 CHRONIC DAILY HEADACHE: ICD-10-CM

## 2019-06-11 ENCOUNTER — HOSPITAL ENCOUNTER (EMERGENCY)
Facility: HOSPITAL | Age: 19
Discharge: HOME | End: 2019-06-11
Attending: EMERGENCY MEDICINE
Payer: COMMERCIAL

## 2019-06-11 VITALS
HEART RATE: 79 BPM | RESPIRATION RATE: 18 BRPM | SYSTOLIC BLOOD PRESSURE: 113 MMHG | BODY MASS INDEX: 29.98 KG/M2 | TEMPERATURE: 97 F | WEIGHT: 164.46 LBS | OXYGEN SATURATION: 99 % | DIASTOLIC BLOOD PRESSURE: 64 MMHG

## 2019-06-11 DIAGNOSIS — K59.00 CONSTIPATION, UNSPECIFIED CONSTIPATION TYPE: Primary | ICD-10-CM

## 2019-06-11 PROCEDURE — 99283 EMERGENCY DEPT VISIT LOW MDM: CPT | Performed by: EMERGENCY MEDICINE

## 2019-06-11 PROCEDURE — 99283 EMERGENCY DEPT VISIT LOW MDM: CPT

## 2019-06-11 RX ORDER — SODIUM PHOSPHATE, DIBASIC AND SODIUM PHOSPHATE, MONOBASIC 7; 19 G/133ML; G/133ML
1 ENEMA RECTAL ONCE
Status: COMPLETED | OUTPATIENT
Start: 2019-06-11 | End: 2019-06-11

## 2019-06-11 RX ORDER — MAGNESIUM CARB/ALUMINUM HYDROX 105-160MG
296 TABLET,CHEWABLE ORAL ONCE
Status: COMPLETED | OUTPATIENT
Start: 2019-06-11 | End: 2019-06-11

## 2019-06-11 RX ORDER — DEXAMETHASONE 1 MG
1 TABLET ORAL
Qty: 5 TABLET | Refills: 0 | Status: SHIPPED | OUTPATIENT
Start: 2019-06-11 | End: 2019-06-16

## 2019-06-11 RX ADMIN — MAGNESIUM CITRATE 296 ML: 1.75 LIQUID ORAL at 17:33

## 2019-06-11 RX ADMIN — SODIUM PHOSPHATE 1 ENEMA: 7; 19 ENEMA RECTAL at 17:33

## 2019-06-12 ENCOUNTER — OFFICE VISIT (OUTPATIENT)
Dept: FAMILY MEDICINE CLINIC | Facility: CLINIC | Age: 19
End: 2019-06-12

## 2019-06-12 VITALS
WEIGHT: 164.2 LBS | HEIGHT: 62 IN | TEMPERATURE: 98.2 F | HEART RATE: 68 BPM | BODY MASS INDEX: 30.22 KG/M2 | DIASTOLIC BLOOD PRESSURE: 62 MMHG | RESPIRATION RATE: 14 BRPM | SYSTOLIC BLOOD PRESSURE: 102 MMHG

## 2019-06-12 DIAGNOSIS — K59.00 CONSTIPATION, UNSPECIFIED CONSTIPATION TYPE: Primary | ICD-10-CM

## 2019-06-12 PROCEDURE — 99213 OFFICE O/P EST LOW 20 MIN: CPT | Performed by: FAMILY MEDICINE

## 2019-06-18 ENCOUNTER — TELEPHONE (OUTPATIENT)
Dept: FAMILY MEDICINE CLINIC | Facility: CLINIC | Age: 19
End: 2019-06-18

## 2019-06-22 ENCOUNTER — APPOINTMENT (OUTPATIENT)
Dept: RADIOLOGY | Age: 19
End: 2019-06-22
Payer: COMMERCIAL

## 2019-06-22 DIAGNOSIS — K59.00 CONSTIPATION, UNSPECIFIED CONSTIPATION TYPE: ICD-10-CM

## 2019-06-22 PROCEDURE — 74018 RADEX ABDOMEN 1 VIEW: CPT

## 2019-06-24 ENCOUNTER — APPOINTMENT (OUTPATIENT)
Dept: LAB | Age: 19
End: 2019-06-24
Payer: COMMERCIAL

## 2019-06-24 LAB
ALBUMIN SERPL BCP-MCNC: 4 G/DL (ref 3.5–5)
ALP SERPL-CCNC: 83 U/L (ref 46–384)
ALT SERPL W P-5'-P-CCNC: 24 U/L (ref 12–78)
ANION GAP SERPL CALCULATED.3IONS-SCNC: 5 MMOL/L (ref 4–13)
AST SERPL W P-5'-P-CCNC: 12 U/L (ref 5–45)
BASOPHILS # BLD AUTO: 0.02 THOUSANDS/ΜL (ref 0–0.1)
BASOPHILS NFR BLD AUTO: 0 % (ref 0–1)
BILIRUB SERPL-MCNC: 0.29 MG/DL (ref 0.2–1)
BUN SERPL-MCNC: 13 MG/DL (ref 5–25)
CALCIUM SERPL-MCNC: 9 MG/DL (ref 8.3–10.1)
CHLORIDE SERPL-SCNC: 110 MMOL/L (ref 100–108)
CO2 SERPL-SCNC: 25 MMOL/L (ref 21–32)
CREAT SERPL-MCNC: 0.92 MG/DL (ref 0.6–1.3)
EOSINOPHIL # BLD AUTO: 0.06 THOUSAND/ΜL (ref 0–0.61)
EOSINOPHIL NFR BLD AUTO: 1 % (ref 0–6)
ERYTHROCYTE [DISTWIDTH] IN BLOOD BY AUTOMATED COUNT: 13.4 % (ref 11.6–15.1)
GFR SERPL CREATININE-BSD FRML MDRD: 91 ML/MIN/1.73SQ M
GLUCOSE P FAST SERPL-MCNC: 76 MG/DL (ref 65–99)
HCT VFR BLD AUTO: 41.7 % (ref 34.8–46.1)
HGB BLD-MCNC: 13 G/DL (ref 11.5–15.4)
IMM GRANULOCYTES # BLD AUTO: 0.01 THOUSAND/UL (ref 0–0.2)
IMM GRANULOCYTES NFR BLD AUTO: 0 % (ref 0–2)
LYMPHOCYTES # BLD AUTO: 2.15 THOUSANDS/ΜL (ref 0.6–4.47)
LYMPHOCYTES NFR BLD AUTO: 44 % (ref 14–44)
MCH RBC QN AUTO: 26 PG (ref 26.8–34.3)
MCHC RBC AUTO-ENTMCNC: 31.2 G/DL (ref 31.4–37.4)
MCV RBC AUTO: 83 FL (ref 82–98)
MONOCYTES # BLD AUTO: 0.46 THOUSAND/ΜL (ref 0.17–1.22)
MONOCYTES NFR BLD AUTO: 9 % (ref 4–12)
NEUTROPHILS # BLD AUTO: 2.17 THOUSANDS/ΜL (ref 1.85–7.62)
NEUTS SEG NFR BLD AUTO: 46 % (ref 43–75)
NRBC BLD AUTO-RTO: 0 /100 WBCS
PLATELET # BLD AUTO: 294 THOUSANDS/UL (ref 149–390)
PMV BLD AUTO: 10.9 FL (ref 8.9–12.7)
POTASSIUM SERPL-SCNC: 4.1 MMOL/L (ref 3.5–5.3)
PROT SERPL-MCNC: 7.5 G/DL (ref 6.4–8.2)
RBC # BLD AUTO: 5 MILLION/UL (ref 3.81–5.12)
SODIUM SERPL-SCNC: 140 MMOL/L (ref 136–145)
WBC # BLD AUTO: 4.87 THOUSAND/UL (ref 4.31–10.16)

## 2019-06-24 PROCEDURE — 80053 COMPREHEN METABOLIC PANEL: CPT | Performed by: FAMILY MEDICINE

## 2019-06-24 PROCEDURE — 85025 COMPLETE CBC W/AUTO DIFF WBC: CPT | Performed by: FAMILY MEDICINE

## 2019-06-24 PROCEDURE — 36415 COLL VENOUS BLD VENIPUNCTURE: CPT | Performed by: FAMILY MEDICINE

## 2019-06-25 ENCOUNTER — OFFICE VISIT (OUTPATIENT)
Dept: FAMILY MEDICINE CLINIC | Facility: CLINIC | Age: 19
End: 2019-06-25

## 2019-06-25 VITALS
BODY MASS INDEX: 30.73 KG/M2 | DIASTOLIC BLOOD PRESSURE: 70 MMHG | WEIGHT: 162.8 LBS | HEIGHT: 61 IN | RESPIRATION RATE: 16 BRPM | HEART RATE: 94 BPM | TEMPERATURE: 97.3 F | SYSTOLIC BLOOD PRESSURE: 102 MMHG

## 2019-06-25 DIAGNOSIS — K59.00 CONSTIPATION, UNSPECIFIED CONSTIPATION TYPE: Primary | ICD-10-CM

## 2019-06-25 PROCEDURE — 99213 OFFICE O/P EST LOW 20 MIN: CPT | Performed by: FAMILY MEDICINE

## 2019-06-27 ENCOUNTER — TELEPHONE (OUTPATIENT)
Dept: NEUROLOGY | Facility: CLINIC | Age: 19
End: 2019-06-27

## 2019-06-27 NOTE — TELEPHONE ENCOUNTER
pt called requesting an urgent appt  She  states that she is still having blurry vision, dizziness, sensitivtiy to light and sound and headaches have gotten worse recently   having these symptoms all day, everyday      currently 9/10   no venlafaxine 75mg 1 tab daily   I did schedule pt to see you on 7/1  [phone]

## 2019-06-28 ENCOUNTER — TELEPHONE (OUTPATIENT)
Dept: FAMILY MEDICINE CLINIC | Facility: CLINIC | Age: 19
End: 2019-06-28

## 2019-06-28 DIAGNOSIS — N92.1 MENORRHAGIA WITH IRREGULAR CYCLE: Primary | ICD-10-CM

## 2019-06-30 NOTE — PROGRESS NOTES
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 Last 3 Encounters:   07/01/19 110/80   06/25/19 102/70   06/12/19 102/62     Pulse Readings from Last 3 Encounters:   07/01/19 80   06/25/19 94   06/12/19 68         Well developed, well nourished, well groomed  No dysmorphic features  HEENT:  Normocephalic atraumatic  No meningismus  Oropharynx is clear and moist  No oral mucosal lesions  Chest:  Respirations regular and unlabored  Cardiovascular:  Regular rate, intact distal pulses  Distal extremities warm without palpable edema or tenderness, no observed significant swelling  Musculoskeletal:  Full range of motion  (see below under neurologic exam for evaluation of motor function and gait)   Skin:  warm and dry, not diaphoretic  No apparent birthmarks or stigmata of neurocutaneous disease  Psychiatric:  Depressed mood and affect        Neurological Examination:     Mental status/cognitive function:   Orientated to time, place and person  Recent and remote memory intact  Attention span and concentration as well as fund of knowledge are appropriate for age  Normal language and spontaneous speech  Cranial Nerves:  III, IV, VI-Pupils were equal, round, and reactive to light  Extraocular movements were full and conjugate without nystagmus     VII-facial expression symmetric, intact forehead wrinkle, strong eye closure, symmetric smile    VIII-hearing grossly intact bilaterally   Motor Exam: symmetric bulk throughout  no atrophy, fasciculations or abnormal movements noted  Coordination:  no apparent dysmetria, ataxia or tremor noted  Gait: steady casual gait           Pertinent lab results:   02/06/2019:  CMP unremarkable  B12 387    TSH 1 804  04/26/2019:  CBC unremarkable     Imaging:   MRI brain with and without contrast scheduled for 02/07/2019 - no showed  Noncontrast head CT 05/21/2018:  Unremarkable    Review of Systems:   DARBY Personally reviewed  Review of Systems   Constitutional: Positive for fatigue  Negative for appetite change and fever  HENT: Negative  Negative for hearing loss, tinnitus, trouble swallowing and voice change  Eyes: Negative  Negative for photophobia and pain  Respiratory: Negative  Negative for shortness of breath  Cardiovascular: Negative  Negative for palpitations  Gastrointestinal: Positive for nausea and vomiting  Endocrine: Negative  Negative for cold intolerance and heat intolerance  Genitourinary: Negative  Negative for dysuria, frequency and urgency  Musculoskeletal: Negative  Negative for myalgias and neck pain  Skin: Negative  Negative for rash  Neurological: Positive for dizziness, light-headedness and headaches  Negative for tremors, seizures, syncope, facial asymmetry, speech difficulty, weakness and numbness  Memory issues   Hematological: Negative  Does not bruise/bleed easily  Psychiatric/Behavioral: Positive for sleep disturbance  Negative for confusion and hallucinations  I have spent 30 minutes with Patient  today in which greater than 50% of this time was spent in counseling/coordination of care regarding Prognosis, Risks and benefits of tx options, Intructions for management, Importance of tx compliance, Risk factor reductions and Impressions        Author:  Rolo Robles MD 7/1/2019 9:00 AM

## 2019-07-01 ENCOUNTER — TELEPHONE (OUTPATIENT)
Dept: NEUROLOGY | Facility: CLINIC | Age: 19
End: 2019-07-01

## 2019-07-01 ENCOUNTER — OFFICE VISIT (OUTPATIENT)
Dept: NEUROLOGY | Facility: CLINIC | Age: 19
End: 2019-07-01
Payer: COMMERCIAL

## 2019-07-01 VITALS
BODY MASS INDEX: 31.47 KG/M2 | DIASTOLIC BLOOD PRESSURE: 80 MMHG | HEART RATE: 80 BPM | HEIGHT: 61 IN | WEIGHT: 166.7 LBS | SYSTOLIC BLOOD PRESSURE: 110 MMHG | RESPIRATION RATE: 14 BRPM

## 2019-07-01 DIAGNOSIS — G43.709 CHRONIC MIGRAINE WITHOUT AURA WITHOUT STATUS MIGRAINOSUS, NOT INTRACTABLE: Primary | ICD-10-CM

## 2019-07-01 DIAGNOSIS — F32.1 CURRENT MODERATE EPISODE OF MAJOR DEPRESSIVE DISORDER WITHOUT PRIOR EPISODE (HCC): ICD-10-CM

## 2019-07-01 DIAGNOSIS — R51.9 CHRONIC DAILY HEADACHE: ICD-10-CM

## 2019-07-01 DIAGNOSIS — R11.0 NAUSEA: ICD-10-CM

## 2019-07-01 PROCEDURE — 99214 OFFICE O/P EST MOD 30 MIN: CPT | Performed by: PSYCHIATRY & NEUROLOGY

## 2019-07-01 RX ORDER — VENLAFAXINE HYDROCHLORIDE 150 MG/1
150 CAPSULE, EXTENDED RELEASE ORAL DAILY
Qty: 30 CAPSULE | Refills: 3 | Status: SHIPPED | OUTPATIENT
Start: 2019-07-01 | End: 2020-11-17

## 2019-07-01 RX ORDER — ONDANSETRON 8 MG/1
8 TABLET, ORALLY DISINTEGRATING ORAL EVERY 8 HOURS PRN
Qty: 15 TABLET | Refills: 0 | Status: SHIPPED | OUTPATIENT
Start: 2019-07-01 | End: 2019-11-04
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
## 2019-07-01 NOTE — TELEPHONE ENCOUNTER
Writer has placed in mail the list of in network cbt providers for patient for 3rd time  Patient can see the following under [address] and [facility]   [facility]  [address]  [facility] [address] [address]  [phone]  [place] Location: [facility] 57   [facility] 3073, 98 [facility]  [address]  [address] [place] [address], [address] [place] [address]  [phone]  (Wait list-referring to other agencies at this time)    [facility]  [address]  [address], [address]  [address] [place] [address] : [phone]  [facility]: Renee in Counseling  [address], [address]  [address] [place], [place], 31 [facility] 441 [facility], [address]  213 [facility], 319 [facility], [address]  [phone]  [address] [facility], [address]  101 [facility],  Wai Nielsen 67  [phone]    Dr Luisa Lora, PhD  [facility], 120 [facility]  [address] [facility], [facility] 218, [address] [facility] [address]  [address]  1302 [facility] [address] [address] Rita Jaimes, [address]  176 [facility] 35  [place], [address]  [phone]

## 2019-07-01 NOTE — PROGRESS NOTES
Review of Systems   Constitutional: Positive for fatigue  Negative for appetite change and fever  HENT: Negative  Negative for hearing loss, tinnitus, trouble swallowing and voice change  Eyes: Negative  Negative for photophobia and pain  Respiratory: Negative  Negative for shortness of breath  Cardiovascular: Negative  Negative for palpitations  Gastrointestinal: Positive for nausea and vomiting  Endocrine: Negative  Negative for cold intolerance and heat intolerance  Genitourinary: Negative  Negative for dysuria, frequency and urgency  Musculoskeletal: Negative  Negative for myalgias and neck pain  Skin: Negative  Negative for rash  Neurological: Positive for dizziness, light-headedness and headaches  Negative for tremors, seizures, syncope, facial asymmetry, speech difficulty, weakness and numbness  Memory issues   Hematological: Negative  Does not bruise/bleed easily  Psychiatric/Behavioral: Positive for sleep disturbance  Negative for confusion and hallucinations

## 2019-07-01 NOTE — TELEPHONE ENCOUNTER
MD Dorota Calhoun             Hi I see you have sent her a list of therapists twice and she says she has never received them  Her address in the system looks correct now but she wonders if maybe was old address, [address]     Cognitive behavioral therapy would ideal

## 2019-07-02 ENCOUNTER — HOSPITAL ENCOUNTER (OUTPATIENT)
Dept: ULTRASOUND IMAGING | Facility: HOSPITAL | Age: 19
Discharge: HOME | End: 2019-07-02
Payer: COMMERCIAL

## 2019-07-02 ENCOUNTER — OFFICE VISIT (OUTPATIENT)
Dept: FAMILY MEDICINE CLINIC | Facility: CLINIC | Age: 19
End: 2019-07-02

## 2019-07-02 VITALS
WEIGHT: 164.2 LBS | SYSTOLIC BLOOD PRESSURE: 100 MMHG | TEMPERATURE: 96.6 F | DIASTOLIC BLOOD PRESSURE: 82 MMHG | BODY MASS INDEX: 31.03 KG/M2 | HEART RATE: 76 BPM | RESPIRATION RATE: 16 BRPM

## 2019-07-02 DIAGNOSIS — N92.1 MENORRHAGIA WITH IRREGULAR CYCLE: ICD-10-CM

## 2019-07-02 DIAGNOSIS — K59.00 CONSTIPATION, UNSPECIFIED CONSTIPATION TYPE: Primary | ICD-10-CM

## 2019-07-02 PROCEDURE — 76856 US EXAM PELVIC COMPLETE: CPT

## 2019-07-02 PROCEDURE — 99213 OFFICE O/P EST LOW 20 MIN: CPT | Performed by: FAMILY MEDICINE

## 2019-07-02 NOTE — PROGRESS NOTES
Assessment/Plan: Yoanna Mack is a 22 y o  female with:   Problem List Items Addressed This Visit        Other    Constipation - Primary     Overall improving, off meds, but abdominal pain persists  Recent KUB on 6/22 which showed benign non-obstructing stool/gas pattern with no findings concerning for inflammation or other concerning findings  CBC and CMP were benign  Abdominal U/S was obtained today results are not available yet  Patient has an appoint ment with GI in one week  Encouraged follow up  Counseled on compliance with treatment plan  RTC PRN             - Again discussed with patient prior to filling out what the FMLA answers would be  She requested not to fill out form before Abdominal U/S results are back in case it shows something new  She will also follow up with GI in one month  Otherwise requesting forms be filled out based on Major Depression and Migraines diagnosed by neurology yesterday, if nothing concrete is found through workup    -Patient left forms in office  Forms placed in triage bin  Chief Complaint:  Chief Complaint   Patient presents with    Follow-up     fmla paperwork     HPI:   Yoanna Mack is a 22 y o  female here for follow up of constipation  She's not been seen by GI yet, but states abdominal pain has remained unchanged since last visit  Last BM was 5 days ago, off Miralax and Mag Citrate now  She's able to pass gas  Pain is described as intermittent sharp b/l LQ, aggravated by straining to have BM  Alleviated by laying down in certain positions  Movement does not always trigger the pain  No recent trauma  No hernias  She's able to tolerate PO, without any N/V  Patient reported at last visit that she was mistakenly diagnosed with MDD, but has been seen by neurology yesterday and now agrees she has Major Depression and has restarted her Venlafaxine at higher dose   She was also referred to Psychiatry and Behavioral therapy and is in the process of establishing with them  Denies SI/HI  She was also diagnosed with Migraines by Neurology and has been compliant with medications  Continues to report daily "migraines" now, but they've been stable  History:   PMH, FH, reviewed  ROS:  As Per HPI    Physical Exam:  /82 (BP Location: Left arm, Patient Position: Sitting, Cuff Size: Standard)   Pulse 76   Temp (!) 96 6 °F (35 9 °C) (Tympanic)   Resp 16   Wt 74 5 kg (164 lb 3 2 oz)   LMP 06/08/2019 (Exact Date)   BMI 31 03 kg/m²   Physical Exam   Constitutional: No distress  Obese   HENT:   Head: Normocephalic and atraumatic  Eyes: Conjunctivae are normal  Right eye exhibits no discharge  Left eye exhibits no discharge  No scleral icterus  Cardiovascular: Normal rate, regular rhythm and normal heart sounds  No murmur heard  Pulmonary/Chest: Effort normal and breath sounds normal  No respiratory distress  She has no wheezes  Abdominal: Soft  Normal appearance and bowel sounds are normal  There is tenderness  There is no guarding  Normoactive bowel sounds in all four quadrants! Abdominal tenderness to palpation b/l LQ  No peritoneal signs  No hernias, no masses  No flank or CVA tenderness   Neurological: She is alert  She exhibits normal muscle tone  Coordination normal    Skin: Skin is warm and dry  She is not diaphoretic  Psychiatric:   Flat affect   Vitals reviewed        Future Appointments   Date Time Provider Surjit Yu   8/27/2019 12:40 PM Ulysses Padilla MD [facility]   9/9/2019  3:00 PM Bel Katz MD NEURO CTR  [facility]   12/17/2019  2:00 PM Renetta Buck MD NEURO CTR [facility]       Edgard Herrera MD

## 2019-07-02 NOTE — ASSESSMENT & PLAN NOTE
Overall improving, off meds, but abdominal pain persists  Recent KUB on 6/22 which showed benign non-obstructing stool/gas pattern with no findings concerning for inflammation or other concerning findings  CBC and CMP were benign  Abdominal U/S was obtained today results are not available yet  Patient has an appoint ment with GI in one month  Encouraged follow up  Counseled on compliance with treatment plan    RTC PRN

## 2019-07-03 ENCOUNTER — TELEPHONE (OUTPATIENT)
Dept: FAMILY MEDICINE CLINIC | Facility: CLINIC | Age: 19
End: 2019-07-03

## 2019-07-03 DIAGNOSIS — N92.1 MENORRHAGIA WITH IRREGULAR CYCLE: ICD-10-CM

## 2019-07-03 NOTE — TELEPHONE ENCOUNTER
Patient requesting DR Warren Beth; To return call with [employer] Results she had done yesterday at [address]

## 2019-07-06 RX ORDER — IBUPROFEN 600 MG/1
TABLET ORAL
Qty: 60 TABLET | Refills: 0 | Status: SHIPPED | OUTPATIENT
Start: 2019-07-06 | End: 2019-07-28 | Stop reason: SDUPTHER

## 2019-07-08 NOTE — TELEPHONE ENCOUNTER
I reviewed U/S results  Will complete FMLA form accordingly on Tuesday 7/9, when I'm in office next  Thank you!   Apolinar

## 2019-07-09 NOTE — TELEPHONE ENCOUNTER
Pt calling just wanted update on FMLA forms and asking for you to call her sometime today, I made her aware you are seeing patients until 5pm

## 2019-07-09 NOTE — TELEPHONE ENCOUNTER
Discussed findings and form completion with patient extensively  Form completed based on findings  List of medications and imaging results included  Copies placed in scan bin to be scanned into patient's chart  Originals are ready for patient to be picked up in my triage folder  Patient is aware  Thank you!   Apolinar

## 2019-07-10 ENCOUNTER — TELEPHONE (OUTPATIENT)
Dept: NEUROLOGY | Facility: CLINIC | Age: 19
End: 2019-07-10

## 2019-07-10 NOTE — TELEPHONE ENCOUNTER
Patient came to [facility] office with [facility] paperwork along with list of requirements  1  Leave was started on 5/28/19 but does not have return to work date, please clarify when patient is able to return to work  2  Reason for leave: depression, anxiety, severe migraines, serve constipation with vomiting  3  Need Dx and treatment plan for DX  Charges were dropped and payment was received  Blank copy scanned into chart and once completed forms can be faxed to [phone]

## 2019-07-10 NOTE — TELEPHONE ENCOUNTER
Received a vm from patient that she saw PCP and was told that since Dr Jason Townsend had diagnosed her with major depressive disorder and chronic migraines, our office would need to fill out her FMLA  Reviewed PCPs 7/2/19 office note and 7/3/19  telephone encounters and it appears the PCPs office did fill out the FMLA forms  I called patient to clarify this with her and she stated that PCP did fill out FMLA based on their findings  Patient is still requesting our office fill our additional FMLA based on dx of major depressive disorder and chronic migraines  She stated she will be dropping off the forms today at [facility] office

## 2019-07-11 NOTE — TELEPHONE ENCOUNTER
Per record review FMLA was filled out by family medicine on 7/9/19  I never recommended she take a leave of absence from work and have continued to recommend return to work  I would be willing to sign off on one 1/2 day a week off for mental health/therapist for depression, up till now she has been non compliant with this recommendation as well as multiple others as discussed in my notes  I will sign off on this aspect of FMLA once she sends confirmation that  She has established care with a psychologist/therapist and gives their name and number  Our  has sent her a list of therapist at least 3 times

## 2019-07-12 ENCOUNTER — TELEPHONE (OUTPATIENT)
Dept: FAMILY MEDICINE CLINIC | Facility: CLINIC | Age: 19
End: 2019-07-12

## 2019-07-12 NOTE — TELEPHONE ENCOUNTER
Pt called in stated she wants to know why her FMLA paperwork can not be filled out  I reviewed the above information with her  She states she does not understand why you will not fill out her FMLA paperwork for this leave of absence she took from work, when she is having the same symptoms that are worsening when you covered her previous leave of absence  States she decided herself to take a leave from [place], and you "covered her" for those absences  She states the same reasons/symptoms she was out for that period of time have worsened, so she does not understand why you would not fill them out again  States that she feels this is personal against her and would like to report this  She also would like to be referred to another neurologist within our practice

## 2019-07-12 NOTE — TELEPHONE ENCOUNTER
Patient called regarding paperwork for FMLA that Dr Deepak Cano filled out for her  He told her he would cover Abdominal issues but depression and chronic migraines would have to be done by neurology,   There is an encounter for neurology from 7/10/19 stating PCP needs to address  She is willing to come in again if needed     Please call patient to explain what she should do

## 2019-07-12 NOTE — TELEPHONE ENCOUNTER
Noted   I am started here she is upset  Thankfully she already has follow up with Dr Cortes Graham scheduled  Agree with refund  Thankfully her PCP already filled out FMLA

## 2019-07-12 NOTE — TELEPHONE ENCOUNTER
Patient left  questioning why she hasn't heard from us regarding her return to work date, questioning when she can return to work  (FMLA also filled out by PCP on 7/9/19)    Also requesting anxiety medication to "lower the anxiety" that she is experiencing  LMOM for patient to return call  Clinical team: please review Dr Macho Vasquez previous message with patient  (Dr Jess Kee never recommended patient take leave of absence from work  And she's only willing to complete FMLA once we receive confirmation that patient has established care with psych/therapist)  Also inform patient that she would need to follow up with psych for med for anxiety

## 2019-07-12 NOTE — TELEPHONE ENCOUNTER
Patient called back and informed us that she has an intake appt on 7/15 with an Shireen Worthington @ [facility], however could not provide the name of therapist she was seeing  She then asked again, what Dr Jess Kee is requiring in order for her FMLA to be completed  After reviewing this with her again, she stated she doesn't want Dr Jess Kee to fill out forms because she doesn't feel that Dr Jess Kee is providing the care or treatment that she expects and wants refund for forms  Requesting referral to another neurologist for second opinion  Refund request forwarded to billing via email

## 2019-07-12 NOTE — TELEPHONE ENCOUNTER
We discussed this in person and in detail at her recent visit and she noded and expressed understanding and was never upset, even when I stated that I will not fill out the [facility] and recommend return to work  I am really not sure what the issue is since her primary care provider already filled out FMLA  I recommended this as she has many more symptoms that she is being evaluated and treated for than headaches and this would be best managed by one provider, if her PCP wishes to keep her out of work, that is their decision  With regard to the previous leave of absence that was handled by my colleagues, when I started seeing her in February 2019, that day I recommended return to work  She has already been referred to another neurologist within our practice and has upcoming appointment with Dr Catherine Barnes  If she would like to come discuss this in person with me I would be happy to talk to her about it

## 2019-07-12 NOTE — TELEPHONE ENCOUNTER
Informed patient of the need to see/establish care with a psych/therapist prior to [facility] forms being completed  Also informed patient it was never recommended that she stop working, instead patient was actually encouraged to continue working  Patient stated she has attempted 3 times to leave voicemail for [facility] to establish care, but had not heard back  Encouraged patient to pick another practitioner on list to reach out to  Also informed we will not be prescribing anti-anxiety medication  Patient verbalized understanding with instructions  No further questions at this time

## 2019-07-13 NOTE — TELEPHONE ENCOUNTER
I reviewed her chart, including her extensive phone correspondence with neurology  In fact neurology has offered to complete her FMLA forms based on Depression with Anxiety, and allow her 1/2 day per week for mental health therapy, but the patient is refusing this  I agree with with neurology  She has also expressed to me that she would ideally like her FMLA forms to cover a continuous several month period at least, and I stated to her that this is unrealistic based on my assessment of her abdominal pain and chart review/assessment of her depression with anxiety  I also discussed this matter with more than one of our preceptors, while patient was in our clinic, and we agreed that it would be reasonable to give her 1/2 day off for mental health treatment (ideally she would have to show us proof that she's scheduled with behavioral therapy or psychiatry)  As far as her question of continuous several month period, we agreed that if she had an appointment with psychiatry that was just a week or so away, we could fill out forms saying "until evaluation by psychiatry in one week" for example  In summary I understand that Ms Vanessa Thakkar going through a challenging time sympathize with her  From a best practice perspective, I believe that it's in her best interest to either use the FMLA forms that I filled out for her based on abdominal pain, or comply with neurology recommendations  Of course she can come in and see me/my colleagues/her PCP Dr Abraham Albright to be re-evaluated for her depression with anxiety  That's what we are here for  However it's unlikely that the recommendations regarding FMLA will change  Thank you!   Apolinar

## 2019-07-15 NOTE — TELEPHONE ENCOUNTER
Spoke to this patient in our practice today and explained her refund request was sent on 7 12 19 to our billing team   Informed her it is going through [facility]  Provided phone number for CBO

## 2019-07-16 ENCOUNTER — DOCUMENTATION (OUTPATIENT)
Dept: FAMILY MEDICINE CLINIC | Facility: CLINIC | Age: 19
End: 2019-07-16

## 2019-07-16 ENCOUNTER — OFFICE VISIT (OUTPATIENT)
Dept: FAMILY MEDICINE CLINIC | Facility: CLINIC | Age: 19
End: 2019-07-16

## 2019-07-16 VITALS
DIASTOLIC BLOOD PRESSURE: 78 MMHG | BODY MASS INDEX: 30.76 KG/M2 | SYSTOLIC BLOOD PRESSURE: 104 MMHG | RESPIRATION RATE: 16 BRPM | TEMPERATURE: 97.6 F | HEART RATE: 84 BPM | WEIGHT: 162.8 LBS

## 2019-07-16 DIAGNOSIS — F32.1 CURRENT MODERATE EPISODE OF MAJOR DEPRESSIVE DISORDER WITHOUT PRIOR EPISODE (HCC): Primary | ICD-10-CM

## 2019-07-16 PROCEDURE — 99213 OFFICE O/P EST LOW 20 MIN: CPT | Performed by: FAMILY MEDICINE

## 2019-07-16 NOTE — PROGRESS NOTES
Patient dropped off new FMLA forms to be completed based on conversation had with myself and Dr Booker Fell during office visit  Forms completed   Copy made to scan pile and original placed upfront for  on July 17 2019

## 2019-07-16 NOTE — TELEPHONE ENCOUNTER
Have spoken with patient, she is seeing therapist today, did tell her she is released to work  She has more concerns and questions, scheduled with you today

## 2019-07-16 NOTE — ASSESSMENT & PLAN NOTE
-Stable  Improving with Venlafaxine 150 mg PO daily per patient  Continue current regimen  Continue behavioral therapy  Psychiatry referral printed given to patient  Patient counseled  Return/ER precautions reviewed  RTC in 1 month for follow up  New FMLA forms to be completed based on depression with anxiety, once available  Copy to be scanned to chart  Of note, beyond depression and anxiety, patient also appears to be struggling with a disruptive pattern of behavior (as documented over the last several visits) which form barriers to effective care  Although she is soft spoken and calm, her symptoms are inconsistent from one office visit to the next, and she's been non-compliant with therapy in spite of extensive counseling  Additionally she has frequently referred to other providers she has seen in the past as having something personal against her  I believe likely has an underlying personality disorder and would greatly benefit from psychiatry evaluation and a personalized behavioral therapy program to help her overcome these barriers, since she is still quite young and potentially has a bright future ahead of her

## 2019-07-16 NOTE — PROGRESS NOTES
Assessment/Plan: Goran Ba is a 22 y o  female with:   Problem List Items Addressed This Visit        Other    Current moderate episode of major depressive disorder without prior episode ([facility] [address] ) - Primary     -Stable  Improving with Venlafaxine 150 mg PO daily per patient  Continue current regimen  Continue behavioral therapy  Psychiatry referral printed given to patient  Patient counseled  Return/ER precautions reviewed  RTC in 1 month for follow up  New FMLA forms to be completed based on depression with anxiety, once available  Copy to be scanned to chart  Of note, beyond depression and anxiety, patient also appears to be struggling with a disruptive pattern of behavior (as documented over the last several visits) which form barriers to effective care  Although she is soft spoken and calm, her symptoms are inconsistent from one office visit to the next, and she's been non-compliant with therapy in spite of extensive counseling  Additionally she has frequently referred to other providers she has seen in the past as having something personal against her  I believe likely has an underlying personality disorder and would greatly benefit from psychiatry evaluation and a personalized behavioral therapy program to help her overcome these barriers, since she is still quite young and potentially has a bright future ahead of her  Relevant Orders    Ambulatory referral to Psychiatry        Chief Complaint:  Chief Complaint   Patient presents with    Follow-up     fmla papers     HPI:   Goran Ba is a 22 y o  female here for evaluation of depression with anxiety  She was recently evaluated for same condition by neurology and started on Venlafaxine 150 mg PO daily  She's compliant with this medication and reports improvement of depressive symptoms  Denies SI/HI  Denies AH/VH  She's established with behavioral therapy as of 07/15/2019 (Leonela Macias) and will have first therapy session today  Patient has not established with psychiatry yet, and request referral again today  She's otherwise at baseline state of health  She was seen by GI yesterday as well and was diagnosed with IBS, given negative GI workup and constellation of symptoms she presented with  She continues to have intermittent constipation, with episodes of Nausea nearly daily, NBNB vomiting as frequently as once a week  Symptoms usually respond to Pepto bismol  She's here to have FMLA forms completed based on depression with anxiety and is amenable to 1/2 day off per week for behavioral therapy, but she has not brought a new FMLA form and states will drop them off today  Today patient is also requesting FMLA form be back dated to May 28 and be excused for a continuous period for the past 7 weeks while she was being worked up for various providers  She states she's able to go back to work tomorrow 7/17/19  History:   PMH, SH  reviewed  ROS:  As Per HPI    Physical Exam:  /78 (BP Location: Left arm, Patient Position: Sitting, Cuff Size: Standard)   Pulse 84   Temp 97 6 °F (36 4 °C) (Tympanic)   Resp 16   Wt 73 8 kg (162 lb 12 8 oz)   LMP 06/17/2019   BMI 30 76 kg/m²   Physical Exam   Constitutional: No distress  HENT:   Head: Normocephalic and atraumatic  Eyes: Pupils are equal, round, and reactive to light  Conjunctivae are normal  Right eye exhibits no discharge  Left eye exhibits no discharge  No scleral icterus  Cardiovascular: Normal rate, regular rhythm and normal heart sounds  Exam reveals no gallop and no friction rub  No murmur heard  Pulmonary/Chest: Effort normal and breath sounds normal  No respiratory distress  She has no wheezes  She has no rales  Abdominal: Soft  Normal appearance and bowel sounds are normal  There is no tenderness  Neurological: She is alert  Skin: Skin is warm and dry  Capillary refill takes less than 2 seconds  She is not diaphoretic     Vitals reviewed        Future Appointments   Date Time Provider Surjit Yu   8/20/2019  4:20 PM Giorgio Avila MD SW [facility] Hatchet [facility]   8/27/2019 12:40 PM Beti Magana MD [facility]   12/17/2019  2:00 PM MD Pat Coronel  [facility]       [place] MD

## 2019-07-17 ENCOUNTER — APPOINTMENT (OUTPATIENT)
Dept: RADIOLOGY | Age: 19
End: 2019-07-17
Payer: COMMERCIAL

## 2019-07-17 ENCOUNTER — OFFICE VISIT (OUTPATIENT)
Dept: URGENT CARE | Age: 19
End: 2019-07-17
Payer: COMMERCIAL

## 2019-07-17 VITALS
DIASTOLIC BLOOD PRESSURE: 68 MMHG | RESPIRATION RATE: 16 BRPM | HEIGHT: 62 IN | BODY MASS INDEX: 29.63 KG/M2 | WEIGHT: 161 LBS | OXYGEN SATURATION: 98 % | HEART RATE: 89 BPM | TEMPERATURE: 97.5 F | SYSTOLIC BLOOD PRESSURE: 106 MMHG

## 2019-07-17 DIAGNOSIS — T14.90XA INJURY: Primary | ICD-10-CM

## 2019-07-17 DIAGNOSIS — T14.90XA INJURY: ICD-10-CM

## 2019-07-17 PROCEDURE — 73090 X-RAY EXAM OF FOREARM: CPT

## 2019-07-17 PROCEDURE — 99283 EMERGENCY DEPT VISIT LOW MDM: CPT | Performed by: FAMILY MEDICINE

## 2019-07-17 PROCEDURE — 73060 X-RAY EXAM OF HUMERUS: CPT

## 2019-07-17 PROCEDURE — G0382 LEV 3 HOSP TYPE B ED VISIT: HCPCS | Performed by: FAMILY MEDICINE

## 2019-07-17 PROCEDURE — 99213 OFFICE O/P EST LOW 20 MIN: CPT | Performed by: FAMILY MEDICINE

## 2019-07-17 NOTE — PROGRESS NOTES
[address] [facility]        NAME: Kathie Salamanca is a 22 y o  female  : 2000    MRN: [id number]  DATE: 2019  TIME: 6:03 PM    Assessment and Plan   Injury [T14 90XA]  1  Injury  XR humerus left    XR forearm 2 vw left         Patient Instructions     Take motrin or tylenol OTC prn  X-ray without fracture  Follow up with PCP in 3-5 days  Proceed to  ER if symptoms worsen  Chief Complaint     Chief Complaint   Patient presents with    Arm Pain     fell sown stairs last saturday   continues with pain   L arm   several bruises  Ellen Merida no change in ROM         History of Present Illness       HPI  Right arm pain  Mayelin Prateek down "a couple of stairs" 4 days ago  Has been having pain on the left arm since  Achy pain  Worse when lifting heavy objects  No head trauma  Review of Systems   Review of Systems   Respiratory: Negative for shortness of breath  Cardiovascular: Negative for chest pain  Musculoskeletal: Positive for myalgias (left arm)  Negative for back pain, joint swelling and neck pain  Skin: Negative for wound  Current Medications       Current Outpatient Medications:     ibuprofen (MOTRIN) 600 mg tablet, TAKE 1 TABLET BY MOUTH EVERY 6 HOURS AS NEEDED FOR MILD PAIN, MODERATE PAIN/HEADACHES 0K /, Disp: 60 tablet, Rfl: 0    magnesium citrate solution, Take 296 mL by mouth daily as needed (constipation) Do note exceed more than 3 days  , Disp: 600 mL, Rfl: 3    magnesium oxide (MAG-OX) 400 mg, Take 1 tablet (400 mg total) by mouth daily, Disp: 90 tablet, Rfl: 3    melatonin 3 mg, Take 1 tablet (3 mg total) by mouth daily at bedtime, Disp: 90 tablet, Rfl: 1    Multiple Vitamins-Minerals (MULTIVITAMIN GUMMIES ADULTS) CHEW, Chew 1 tablet daily for 90 days, Disp: 90 tablet, Rfl: 3    ondansetron (ZOFRAN-ODT) 8 mg disintegrating tablet, Take 1 tablet (8 mg total) by mouth every 8 (eight) hours as needed for nausea or vomiting, Disp: 15 tablet, Rfl: 0    venlafaxine (EFFEXOR-XR) 150 mg 24 hr capsule, Take 1 capsule (150 mg total) by mouth daily, Disp: 30 capsule, Rfl: 3    docusate sodium (COLACE) 100 mg capsule, Take 1 capsule (100 mg total) by mouth 2 (two) times a day for 30 days (Patient not taking: Reported on 5/1/2019), Disp: 60 capsule, Rfl: 1    senna (SENOKOT) 8 6 mg, Take 1 tablet (8 6 mg total) by mouth daily at bedtime for 14 days (Patient not taking: Reported on 5/1/2019), Disp: 14 tablet, Rfl: 0    senna-docusate sodium (SENOKOT S) 8 6-50 mg per tablet, Take 1 tablet by mouth as needed, Disp: , Rfl:     Current Allergies     Allergies as of 07/17/2019    (No Known Allergies)            The following portions of the patient's history were reviewed and updated as appropriate: allergies, current medications, past family history, past medical history, past social history, past surgical history and problem list      Past Medical History:   Diagnosis Date    Depression     last assessed-9/5/2014    Finger injury     last assessed-8/16/2014    Myalgia     and myositis    No known health problems     Polyuria     last assessed-11/15/2013    Viral gastroenteritis     last assessed-3/4/2013       Past Surgical History:   Procedure Laterality Date    NO PAST SURGERIES         Family History   Problem Relation Age of Onset    Hypertension Mother     Cancer Maternal Grandmother     Cancer Maternal Grandfather          Medications have been verified  Objective   /68   Pulse 89   Temp 97 5 °F (36 4 °C) (Temporal)   Resp 16   Ht 5' 2" (1 575 m)   Wt 73 kg (161 lb)   LMP 06/17/2019   SpO2 98%   BMI 29 45 kg/m²        Physical Exam     Physical Exam   Musculoskeletal:   A bit of bruising on the dital part of the left arm  Palpation of the large muscles of the left arm elicited pain  squeeze strength is 5/5, but with some discomfort  So is pushing and pulling agaisnt resistance

## 2019-07-17 NOTE — PATIENT INSTRUCTIONS
Arm Pain   WHAT YOU NEED TO KNOW:   Your arm pain may be caused by a number of conditions  Examples include arthritis, nerve problems, or an awkward position while you sleep  X-rays did not show a broken bone in your arm or wrist  Arm pain may be a sign of a serious condition that needs immediate care, such as a heart attack  DISCHARGE INSTRUCTIONS:   Call 911 for any of the following: You have any of the following signs of a heart attack:   · Squeezing, pressure, or pain in your chest that lasts longer than 5 minutes or returns    · Discomfort or pain in your back, neck, jaw, stomach, or arm     · Trouble breathing or a fast, fluttery heartbeat    · Nausea or vomiting    · Lightheadedness or a sudden cold sweat, especially with chest pain or trouble breathing  Return to the emergency department if:   · You have severe pain, or pain that spreads from your arm to other areas  · You have swelling, tingling, or numbness in your hand or fingers, or the skin turns blue  · You cannot move your arm  Contact your healthcare provider if:   · You have questions or concerns about your condition or care  Medicines: You may need any of the following:  · Prescription pain medicine  may be given  Ask how to take this medicine safely  · NSAIDs , such as ibuprofen, help decrease swelling, pain, and fever  This medicine is available with or without a doctor's order  NSAIDs can cause stomach bleeding or kidney problems in certain people  If you take blood thinner medicine, always ask your healthcare provider if NSAIDs are safe for you  Always read the medicine label and follow directions  · Take your medicine as directed  Contact your healthcare provider if you think your medicine is not helping or if you have side effects  Tell him or her if you are allergic to any medicine  Keep a list of the medicines, vitamins, and herbs you take  Include the amounts, and when and why you take them   Bring the list or the pill bottles to follow-up visits  Carry your medicine list with you in case of an emergency  Self-care:   · Rest your arm as directed  A sling may be used to keep your arm from moving while it heals  · Apply ice as directed  Ice helps decrease pain and swelling  Ice may also help prevent tissue damage  Use an ice pack, or put crushed ice in a plastic bag  Cover it with a towel  Apply it to your arm for 20 minutes every few hours, or as directed  Ask how many times to apply ice each day, and for how many days  · Elevate your arm above the level of your heart as often as you can  This will help decrease swelling and pain  Prop your arm on pillows or blankets to keep the area elevated comfortably  · Adjust your position if you work in front of a computer  You may need arm or wrist supports or change the height of your chair  · Keep a pain record  Write down when your pain happens and how severe it is  Include any other symptoms you have with your pain  A record will help you keep track of pain cycles  Bring the record with you to your follow-up visits  It may also help your healthcare provider find out what is causing your pain  Follow up with your healthcare provider as directed: You may need physical therapy  You may need to see an orthopedic specialist  Write down your questions so you remember to ask them during your visits  © 2017 [employer] Information is for End User's use only and may not be sold, redistributed or otherwise used for commercial purposes  All illustrations and images included in CareNotes® are the copyrighted property of A D A M , Inc  or [employer]  The above information is an  only  It is not intended as medical advice for individual conditions or treatments  Talk to your doctor, nurse or pharmacist before following any medical regimen to see if it is safe and effective for you
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
## 2019-07-18 NOTE — TELEPHONE ENCOUNTER
After discussion with Dr Christina Montejo, the form was addended to change return to work date to 7/19 due to diarrhea  Discussed with patient today on the phone and she states that she is now feeling ready to go to work on 7/19 and agreeable to no other exceptions being made to [facility]  She states she did not have diarrhea at last visit with me, and she had decided not to mention the wrist injury because it was not causing significant problems and states "I do not believe it will stop me from going back to work" on 7/19  FMLA placed in bin to be scanned to chart with changes and original given to patient  Thank you!   Apolinar

## 2019-07-18 NOTE — TELEPHONE ENCOUNTER
I saw patient yesterday, and neither diarrhea nor any reports of falls or injuries, MSK bruising or tenderness to palpation, physical impairement were reported at that time and she stated she was ready to go back to work on 07/17  Today I also see a note from an urgent care center where patient has reported a fall with injury 4 days ago with significant symptoms causing bruising, tenderness and physical impairment  I anticipate that her request for FMLA extension will also be affected by these physical injuries, in which case she may request an additional addendum to her FMLA form  I will discuss her FMLA with Dr Dwayne Amaya tomorrow 07/18 since I anticipate that patient will request to speak to Dr Dwayne Amaya (my supervisor) directly if her expectations are not met, based on my encounter with her at yesterday's visit

## 2019-07-28 DIAGNOSIS — N92.1 MENORRHAGIA WITH IRREGULAR CYCLE: ICD-10-CM

## 2019-07-28 RX ORDER — IBUPROFEN 600 MG/1
TABLET ORAL
Qty: 60 TABLET | Refills: 0 | Status: SHIPPED | OUTPATIENT
Start: 2019-07-28 | End: 2019-08-11 | Stop reason: SDUPTHER

## 2019-08-11 DIAGNOSIS — N92.1 MENORRHAGIA WITH IRREGULAR CYCLE: ICD-10-CM

## 2019-08-12 RX ORDER — IBUPROFEN 600 MG/1
TABLET ORAL
Qty: 60 TABLET | Refills: 0 | Status: SHIPPED | OUTPATIENT
Start: 2019-08-12 | End: 2019-09-12 | Stop reason: SDUPTHER

## 2019-09-12 DIAGNOSIS — N92.1 MENORRHAGIA WITH IRREGULAR CYCLE: ICD-10-CM

## 2019-09-13 ENCOUNTER — OFFICE VISIT (OUTPATIENT)
Dept: FAMILY MEDICINE CLINIC | Facility: CLINIC | Age: 19
End: 2019-09-13

## 2019-09-13 VITALS
RESPIRATION RATE: 16 BRPM | WEIGHT: 159.4 LBS | TEMPERATURE: 96.4 F | HEART RATE: 84 BPM | SYSTOLIC BLOOD PRESSURE: 100 MMHG | DIASTOLIC BLOOD PRESSURE: 72 MMHG | HEIGHT: 62 IN | BODY MASS INDEX: 29.33 KG/M2

## 2019-09-13 DIAGNOSIS — F32.1 CURRENT MODERATE EPISODE OF MAJOR DEPRESSIVE DISORDER WITHOUT PRIOR EPISODE (HCC): Primary | ICD-10-CM

## 2019-09-13 PROCEDURE — 99213 OFFICE O/P EST LOW 20 MIN: CPT | Performed by: FAMILY MEDICINE

## 2019-09-13 RX ORDER — LINACLOTIDE 145 UG/1
CAPSULE, GELATIN COATED ORAL
COMMUNITY
Start: 2019-08-25 | End: 2019-11-04

## 2019-09-13 NOTE — PROGRESS NOTES
Kelvin Parrish 2000 female MRN: [id number]    Family Medicine Follow-up Visit    ASSESSMENT/PLAN  Problem List Items Addressed This Visit        Other    Current moderate episode of major depressive disorder without prior episode ([facility] [address] ) - Primary     Pt here to get paper work filled out  She reports her work scheduled was recently changed to 10 to 6:30 instead of 7- 3 pm making it difficult for her to get to her therapy sessions  Otherwise she is at baseline health(Mood is stable on her current regimen)    - [facility] Provider Recommendation for medical accomodation form filled out   - Copy scanned to pt's chart  Future Appointments   Date Time Provider Surjit Nicholas   12/17/2019  2:00 PM Priscilla Hunt MD [facility]          SUBJECTIVE  CC: Forms      HPI:  Kelvin Parrish is a 20 y o  female who presents to form to be filled out  Pt has a hx of Anxiety/ Depression and on twice weekly therapy  Pt reports her schedule was recently changed to 6;30 pm, making it difficult for her to attend her therapy sessions  Otherwise reports she is at baseline with no c/o  Review of Systems   Constitutional: Negative for appetite change and fever  HENT: Negative  Eyes: Negative  Respiratory: Negative  Cardiovascular: Negative for chest pain and palpitations  Gastrointestinal: Negative for abdominal pain, constipation and vomiting  Genitourinary: Negative for dysuria  Musculoskeletal: Negative for arthralgias and joint swelling  Skin: Negative for rash  Allergic/Immunologic: Negative  Neurological: Positive for headaches  Psychiatric/Behavioral: Negative for agitation          Mood is stable       Historical Information   The patient history was reviewed as follows:    Past Medical History:   Diagnosis Date    Depression     last assessed-9/5/2014    Finger injury     last assessed-8/16/2014    Gastroenteritis     Myalgia     and myositis  No known health problems     Polyuria     last assessed-11/15/2013    Viral gastroenteritis     last assessed-3/4/2013     Past Surgical History:   Procedure Laterality Date    NO PAST SURGERIES       Family History   Problem Relation Age of Onset    Hypertension Mother     Cancer Maternal Grandmother     Cancer Maternal Grandfather       Social History   Social History     Substance and Sexual Activity   Alcohol Use No     Social History     Substance and Sexual Activity   Drug Use No     Social History     Tobacco Use   Smoking Status Never Smoker   Smokeless Tobacco Never Used       Medications:     Current Outpatient Medications:     ibuprofen (MOTRIN) 600 mg tablet, TAKE 1 TABLET BY MOUTH EVERY 6 HOURS AS NEEDED FOR MILD PAIN, MODERATE PAIN/HEADACHES 0K 6/9, Disp: 60 tablet, Rfl: 0    LINZESS 145 MCG CAPS, , Disp: , Rfl:     melatonin 3 mg, Take 1 tablet (3 mg total) by mouth daily at bedtime, Disp: 90 tablet, Rfl: 1    Multiple Vitamins-Minerals (MULTIVITAMIN GUMMIES ADULTS) CHEW, Chew 1 tablet daily for 90 days, Disp: 90 tablet, Rfl: 3    ondansetron (ZOFRAN-ODT) 8 mg disintegrating tablet, Take 1 tablet (8 mg total) by mouth every 8 (eight) hours as needed for nausea or vomiting, Disp: 15 tablet, Rfl: 0    venlafaxine (EFFEXOR-XR) 150 mg 24 hr capsule, Take 1 capsule (150 mg total) by mouth daily, Disp: 30 capsule, Rfl: 3  No Known Allergies    OBJECTIVE    Vitals:   Vitals:    09/13/19 1425   BP: 100/72   BP Location: Left arm   Patient Position: Sitting   Cuff Size: Standard   Pulse: 84   Resp: 16   Temp: (!) 96 4 °F (35 8 °C)   TempSrc: Tympanic   Weight: 72 3 kg (159 lb 6 4 oz)   Height: 5' 2" (1 575 m)           Physical Exam   Constitutional: She appears well-developed and well-nourished  HENT:   Head: Normocephalic and atraumatic  Eyes: Conjunctivae are normal    Neck: Normal range of motion  Musculoskeletal: Normal range of motion  Neurological: She is alert     Skin: Skin is warm and dry  Psychiatric: She has a normal mood and affect  Vitals reviewed                   Ari Latif MD, PGY-2  [facility]   9/13/2019

## 2019-09-14 RX ORDER — IBUPROFEN 600 MG/1
TABLET ORAL
Qty: 60 TABLET | Refills: 0 | Status: SHIPPED | OUTPATIENT
Start: 2019-09-14 | End: 2019-11-04

## 2019-11-04 ENCOUNTER — APPOINTMENT (EMERGENCY)
Dept: RADIOLOGY | Facility: HOSPITAL | Age: 19
End: 2019-11-04
Payer: COMMERCIAL

## 2019-11-04 ENCOUNTER — HOSPITAL ENCOUNTER (EMERGENCY)
Facility: HOSPITAL | Age: 19
Discharge: HOME | End: 2019-11-04
Attending: EMERGENCY MEDICINE
Payer: COMMERCIAL

## 2019-11-04 VITALS
TEMPERATURE: 97.8 F | RESPIRATION RATE: 18 BRPM | WEIGHT: 157.85 LBS | DIASTOLIC BLOOD PRESSURE: 58 MMHG | SYSTOLIC BLOOD PRESSURE: 99 MMHG | HEART RATE: 98 BPM | OXYGEN SATURATION: 99 % | BODY MASS INDEX: 28.87 KG/M2

## 2019-11-04 DIAGNOSIS — M79.642 LEFT HAND PAIN: Primary | ICD-10-CM

## 2019-11-04 PROCEDURE — 99284 EMERGENCY DEPT VISIT MOD MDM: CPT | Performed by: PHYSICIAN ASSISTANT

## 2019-11-04 PROCEDURE — 73130 X-RAY EXAM OF HAND: CPT

## 2019-11-04 PROCEDURE — 99283 EMERGENCY DEPT VISIT LOW MDM: CPT

## 2019-11-04 RX ORDER — IBUPROFEN 600 MG/1
600 TABLET ORAL ONCE
Status: COMPLETED | OUTPATIENT
Start: 2019-11-04 | End: 2019-11-04

## 2019-11-04 RX ORDER — IBUPROFEN 600 MG/1
600 TABLET ORAL EVERY 6 HOURS PRN
Qty: 30 TABLET | Refills: 0 | Status: SHIPPED | OUTPATIENT
Start: 2019-11-04 | End: 2019-11-25

## 2019-11-04 RX ADMIN — IBUPROFEN 600 MG: 600 TABLET, FILM COATED ORAL at 21:35
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
## 2019-11-25 ENCOUNTER — OFFICE VISIT (OUTPATIENT)
Dept: FAMILY MEDICINE CLINIC | Facility: CLINIC | Age: 19
End: 2019-11-25

## 2019-11-25 VITALS
SYSTOLIC BLOOD PRESSURE: 110 MMHG | HEIGHT: 62 IN | RESPIRATION RATE: 18 BRPM | BODY MASS INDEX: 28.85 KG/M2 | TEMPERATURE: 97.2 F | WEIGHT: 156.8 LBS | DIASTOLIC BLOOD PRESSURE: 78 MMHG | HEART RATE: 78 BPM

## 2019-11-25 DIAGNOSIS — N92.6 MISSED PERIOD: Primary | ICD-10-CM

## 2019-11-25 DIAGNOSIS — Z3A.01 LESS THAN 8 WEEKS GESTATION OF PREGNANCY: ICD-10-CM

## 2019-11-25 LAB — SL AMB POCT URINE HCG: POSITIVE

## 2019-11-25 PROCEDURE — 81025 URINE PREGNANCY TEST: CPT | Performed by: FAMILY MEDICINE

## 2019-11-25 PROCEDURE — 1036F TOBACCO NON-USER: CPT | Performed by: FAMILY MEDICINE

## 2019-11-25 PROCEDURE — 3008F BODY MASS INDEX DOCD: CPT | Performed by: FAMILY MEDICINE

## 2019-11-25 PROCEDURE — 99213 OFFICE O/P EST LOW 20 MIN: CPT | Performed by: FAMILY MEDICINE

## 2019-11-25 PROCEDURE — 3725F SCREEN DEPRESSION PERFORMED: CPT | Performed by: FAMILY MEDICINE

## 2019-11-25 NOTE — ASSESSMENT & PLAN NOTE
Patient recently found out that she was pregnant,  positive urine pregnancy test  2 weeks ago   patient waited and  Until now because she was waiting to see if her period Would start  last menstrual period 10/20/2019  patient's together with father of the baby  Patient would like all options including termination  Discussed with patient that we at [address] [place] [address] do offer prenatal care including delivery and after care for the baby  Clinic does not offer termination options  encourage patient to be sure of her decision  Patient states she has discussed this with her boyfriend who is the father of the baby, they are both in agreement  Patient would like termination because she is not prepared for pregnancy or to care for infant    Patient understands that she will be able to receive  at [facility]  patient understand that she may return to office and any time for continued prenatal care if she does not want to terminate
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
## 2019-12-09 ENCOUNTER — OFFICE VISIT (OUTPATIENT)
Dept: FAMILY MEDICINE CLINIC | Facility: CLINIC | Age: 19
End: 2019-12-09

## 2019-12-09 VITALS
WEIGHT: 164 LBS | BODY MASS INDEX: 30.18 KG/M2 | HEIGHT: 62 IN | RESPIRATION RATE: 18 BRPM | HEART RATE: 88 BPM | DIASTOLIC BLOOD PRESSURE: 80 MMHG | SYSTOLIC BLOOD PRESSURE: 120 MMHG | TEMPERATURE: 96.3 F

## 2019-12-09 DIAGNOSIS — Z3A.01 LESS THAN 8 WEEKS GESTATION OF PREGNANCY: Primary | ICD-10-CM

## 2019-12-09 DIAGNOSIS — O21.9 NAUSEA AND VOMITING IN PREGNANCY: ICD-10-CM

## 2019-12-09 DIAGNOSIS — K59.09 OTHER CONSTIPATION: ICD-10-CM

## 2019-12-09 DIAGNOSIS — G43.709 CHRONIC MIGRAINE WITHOUT AURA WITHOUT STATUS MIGRAINOSUS, NOT INTRACTABLE: ICD-10-CM

## 2019-12-09 DIAGNOSIS — F32.1 CURRENT MODERATE EPISODE OF MAJOR DEPRESSIVE DISORDER WITHOUT PRIOR EPISODE (HCC): ICD-10-CM

## 2019-12-09 PROCEDURE — 99213 OFFICE O/P EST LOW 20 MIN: CPT | Performed by: FAMILY MEDICINE

## 2019-12-09 PROCEDURE — 3008F BODY MASS INDEX DOCD: CPT | Performed by: FAMILY MEDICINE

## 2019-12-09 RX ORDER — DIPHENHYDRAMINE HYDROCHLORIDE 25 MG/1
25 CAPSULE ORAL DAILY
Qty: 30 TABLET | Refills: 2 | Status: SHIPPED | OUTPATIENT
Start: 2019-12-09 | End: 2019-12-09 | Stop reason: SDUPTHER

## 2019-12-09 RX ORDER — DIPHENHYDRAMINE HYDROCHLORIDE 25 MG/1
25 CAPSULE ORAL 3 TIMES DAILY
Qty: 30 TABLET | Refills: 0 | Status: SHIPPED | OUTPATIENT
Start: 2019-12-09 | End: 2020-01-13 | Stop reason: SDUPTHER

## 2019-12-09 NOTE — ASSESSMENT & PLAN NOTE
- Will trial Unison 12 5 mg daily QHs and Vit B6  25 mg daily with plan to increase dosing if tolerated  - F/u if symptoms persists or worsen

## 2019-12-09 NOTE — ASSESSMENT & PLAN NOTE
- Advised to start prenatal tabs daily  - Prenatal panel ordered for review at next visit  - Will order Dating [facility] during Intake visit  - Disability forms completed and given to pt at pt's request, copy scanned to chart  - Pt encouraged to follow up for Intake visit    - RTC precautions discussed

## 2019-12-09 NOTE — ASSESSMENT & PLAN NOTE
-Reports daily use of Effexor 150 mg during Pregnancy  -Available studies reviewed that report using Effexor during pregnancy is unlikely to increase the chance of birth defects above the 3-5% background population risk

## 2019-12-09 NOTE — PROGRESS NOTES
Assessment/Plan:       Problem List Items Addressed This Visit        Digestive    Nausea and vomiting in pregnancy     - Will trial Unison 12 5 mg daily QHs and Vit B6  25 mg daily with plan to increase dosing if tolerated  - F/u if symptoms persists or worsen            Cardiovascular and Mediastinum    Headache, chronic migraine without aura     -Advised to stay hydrated at all times by increasing p o hydration  -OK to use Tylenol in Pregnancy            Other    Current moderate episode of major depressive disorder without prior episode (HCC)     -Reports daily use of Effexor 150 mg during Pregnancy  -Available studies reviewed that report using Effexor during pregnancy is unlikely to increase the chance of birth defects above the 3-5% background population risk  Relevant Medications    doxylamine (UNISON) 25 MG tablet    Constipation     - Increase p o hydration, high fiber diet and exercise as tolerated  - Will re-evaluate at next visit and consider use of OTC if this persists           Less than 8 weeks gestation of pregnancy - Primary     - Advised to start prenatal tabs daily  - Prenatal panel ordered for review at next visit  - Will order Dating [facility] during Intake visit  - Disability forms completed and given to pt at pt's request, copy scanned to chart  - Pt encouraged to follow up for Intake visit  - RTC precautions discussed         Relevant Medications    Pyridoxine HCl (VITAMIN B-6) 25 MG tablet    doxylamine (UNISON) 25 MG tablet    Other Relevant Orders    Prenatal Panel            Subjective:      Patient ID: Shruti Villalpando is a 20 y o  female  HPI   Pt here with medical forms for disability  She found out she was pregnant and she has decided to keep the pregnancy after initially comtemplating a medical   LMP: 10/20/19  She reports she was spotting for about 3 days but that has resolved    Pt reports vomiting about 3 times daily with associated nausea,occasional abdominal cramping and feeling sick always  Pt denies bleeding per vaginal and strong contractions  Pt reports she will like to take time of work until her delivery in July 2020    Hx of Depression with stable mood on Effexor 150 mg daily  She reports still taking the medication because it does help her mood symptoms  The following portions of the patient's history were reviewed and updated as appropriate: allergies, current medications, past family history, past medical history, past social history, past surgical history and problem list     Review of Systems   Constitutional: Negative for chills and fever  Respiratory: Negative for shortness of breath  Gastrointestinal: Positive for constipation, nausea and vomiting  Genitourinary: Negative for vaginal bleeding and vaginal discharge  Neurological: Positive for headaches  Objective:      /80 (BP Location: Left arm, Patient Position: Sitting, Cuff Size: Large)   Pulse 88   Temp (!) 96 3 °F (35 7 °C) (Tympanic)   Resp 18   Ht 5' 2 1" (1 577 m)   Wt 74 4 kg (164 lb)   BMI 29 90 kg/m²          Physical Exam   Constitutional: She appears well-developed and well-nourished  HENT:   Head: Normocephalic and atraumatic  Neck: Normal range of motion  Cardiovascular: Normal rate, regular rhythm and normal heart sounds  No murmur heard  Pulmonary/Chest: Effort normal and breath sounds normal  She has no wheezes  She has no rales  Abdominal: Soft  Bowel sounds are normal  She exhibits no distension  There is no tenderness  Musculoskeletal: Normal range of motion  Neurological: She is alert  Skin: Skin is warm and dry  Psychiatric: She has a normal mood and affect  Vitals reviewed

## 2019-12-09 NOTE — ASSESSMENT & PLAN NOTE
- Increase p o hydration, high fiber diet and exercise as tolerated  - Will re-evaluate at next visit and consider use of OTC if this persists

## 2019-12-12 ENCOUNTER — TELEPHONE (OUTPATIENT)
Dept: FAMILY MEDICINE CLINIC | Facility: CLINIC | Age: 19
End: 2019-12-12

## 2019-12-12 DIAGNOSIS — G43.709 CHRONIC MIGRAINE WITHOUT AURA WITHOUT STATUS MIGRAINOSUS, NOT INTRACTABLE: Primary | ICD-10-CM

## 2019-12-12 NOTE — TELEPHONE ENCOUNTER
Nolberto Sunshine from [facility] called because patient has an appointment on 12/17/2019 but needs a referral to Neurology by  Dr De Anda Cones to   Nolberto Sunshine can be reached at [phone]

## 2019-12-17 ENCOUNTER — OFFICE VISIT (OUTPATIENT)
Dept: NEUROLOGY | Facility: CLINIC | Age: 19
End: 2019-12-17
Payer: COMMERCIAL

## 2019-12-17 VITALS
BODY MASS INDEX: 29.72 KG/M2 | WEIGHT: 163 LBS | HEART RATE: 91 BPM | SYSTOLIC BLOOD PRESSURE: 118 MMHG | DIASTOLIC BLOOD PRESSURE: 84 MMHG

## 2019-12-17 DIAGNOSIS — E55.9 VITAMIN D DEFICIENCY: ICD-10-CM

## 2019-12-17 DIAGNOSIS — G89.29 CHRONIC BILATERAL LOW BACK PAIN, UNSPECIFIED WHETHER SCIATICA PRESENT: ICD-10-CM

## 2019-12-17 DIAGNOSIS — F32.1 CURRENT MODERATE EPISODE OF MAJOR DEPRESSIVE DISORDER WITHOUT PRIOR EPISODE (HCC): ICD-10-CM

## 2019-12-17 DIAGNOSIS — R51.9 CHRONIC DAILY HEADACHE: ICD-10-CM

## 2019-12-17 DIAGNOSIS — M54.50 CHRONIC BILATERAL LOW BACK PAIN, UNSPECIFIED WHETHER SCIATICA PRESENT: ICD-10-CM

## 2019-12-17 DIAGNOSIS — G43.709 CHRONIC MIGRAINE WITHOUT AURA WITHOUT STATUS MIGRAINOSUS, NOT INTRACTABLE: Primary | ICD-10-CM

## 2019-12-17 PROBLEM — M54.2 CERVICALGIA: Status: RESOLVED | Noted: 2019-05-01 | Resolved: 2019-12-17

## 2019-12-17 PROBLEM — R41.3 MEMORY DIFFICULTY: Status: RESOLVED | Noted: 2018-07-30 | Resolved: 2019-12-17

## 2019-12-17 PROBLEM — E66.9 OBESITY: Status: RESOLVED | Noted: 2018-06-01 | Resolved: 2019-12-17

## 2019-12-17 PROBLEM — R42 DIZZINESS AND GIDDINESS: Status: RESOLVED | Noted: 2018-07-30 | Resolved: 2019-12-17

## 2019-12-17 PROCEDURE — 99214 OFFICE O/P EST MOD 30 MIN: CPT | Performed by: PSYCHIATRY & NEUROLOGY

## 2019-12-17 RX ORDER — CYPROHEPTADINE HYDROCHLORIDE 4 MG/1
TABLET ORAL
Qty: 30 TABLET | Refills: 3 | Status: SHIPPED | OUTPATIENT
Start: 2019-12-17 | End: 2020-11-17

## 2019-12-17 RX ORDER — CYCLOBENZAPRINE HCL 5 MG
TABLET ORAL
Qty: 60 TABLET | Refills: 3 | Status: SHIPPED | OUTPATIENT
Start: 2019-12-17 | End: 2020-11-17

## 2019-12-17 NOTE — PROGRESS NOTES
Review of Systems   Constitutional: Positive for fatigue  Negative for appetite change and fever  HENT: Negative  Negative for hearing loss, tinnitus, trouble swallowing and voice change  Sensitive to noise and smell   Eyes: Positive for photophobia and visual disturbance (blurry vision and spots before the eyes )  Negative for pain  Respiratory: Negative  Negative for shortness of breath  Cardiovascular: Negative  Negative for palpitations  Gastrointestinal: Positive for nausea and vomiting  Endocrine: Negative  Negative for cold intolerance and heat intolerance  Genitourinary: Negative  Negative for dysuria, frequency and urgency  Musculoskeletal: Negative  Negative for myalgias and neck pain  Skin: Negative  Negative for rash  Neurological: Positive for dizziness, light-headedness, numbness (head frontal ) and headaches  Negative for tremors, seizures, syncope, facial asymmetry, speech difficulty and weakness  Hematological: Negative  Does not bruise/bleed easily  Psychiatric/Behavioral: Negative  Negative for confusion, hallucinations and sleep disturbance

## 2019-12-20 ENCOUNTER — TELEPHONE (OUTPATIENT)
Dept: PSYCHIATRY | Facility: CLINIC | Age: 19
End: 2019-12-20

## 2020-01-13 ENCOUNTER — OFFICE VISIT (OUTPATIENT)
Dept: FAMILY MEDICINE CLINIC | Facility: CLINIC | Age: 20
End: 2020-01-13

## 2020-01-13 VITALS
WEIGHT: 165 LBS | TEMPERATURE: 97.4 F | HEIGHT: 62 IN | BODY MASS INDEX: 30.36 KG/M2 | HEART RATE: 82 BPM | DIASTOLIC BLOOD PRESSURE: 72 MMHG | RESPIRATION RATE: 18 BRPM | SYSTOLIC BLOOD PRESSURE: 130 MMHG

## 2020-01-13 DIAGNOSIS — M54.50 CHRONIC BILATERAL LOW BACK PAIN, UNSPECIFIED WHETHER SCIATICA PRESENT: ICD-10-CM

## 2020-01-13 DIAGNOSIS — G43.709 CHRONIC MIGRAINE WITHOUT AURA WITHOUT STATUS MIGRAINOSUS, NOT INTRACTABLE: ICD-10-CM

## 2020-01-13 DIAGNOSIS — G47.00 INSOMNIA, UNSPECIFIED TYPE: ICD-10-CM

## 2020-01-13 DIAGNOSIS — O21.9 NAUSEA AND VOMITING IN PREGNANCY: ICD-10-CM

## 2020-01-13 DIAGNOSIS — G89.29 CHRONIC BILATERAL LOW BACK PAIN, UNSPECIFIED WHETHER SCIATICA PRESENT: ICD-10-CM

## 2020-01-13 DIAGNOSIS — Z3A.12 12 WEEKS GESTATION OF PREGNANCY: Primary | ICD-10-CM

## 2020-01-13 PROCEDURE — 3008F BODY MASS INDEX DOCD: CPT | Performed by: FAMILY MEDICINE

## 2020-01-13 PROCEDURE — 99213 OFFICE O/P EST LOW 20 MIN: CPT | Performed by: FAMILY MEDICINE

## 2020-01-13 RX ORDER — DIPHENHYDRAMINE HYDROCHLORIDE 25 MG/1
25 CAPSULE ORAL 3 TIMES DAILY
Qty: 90 TABLET | Refills: 0 | Status: SHIPPED | OUTPATIENT
Start: 2020-01-13 | End: 2020-11-17

## 2020-01-13 NOTE — ASSESSMENT & PLAN NOTE
Patient takes Tylenol twice a day for headache  States she is currently having headache, 6/10  Patient does follow with Neurology, next appointment in February 2020  Will recommend patient to increase her Tylenol to 650 mg 3 times a day  Do not take more than 3000mg in a 24 hour as this is dangerous to your liver

## 2020-01-13 NOTE — ASSESSMENT & PLAN NOTE
Patient states her insomnia is stemming from nausea  Will prescribe patient with 1 month worth of pyridoxine  Return if symptom worsens

## 2020-01-13 NOTE — ASSESSMENT & PLAN NOTE
Estimated 12 week gestational pregnancy  Patient has not completed prenatal blood work at this time  Will reprint  Will send patient for dating ultrasound  Continue prenatal vitamins  Refill peridoxine for morning sickness  Follow-up in 1 month

## 2020-01-13 NOTE — PROGRESS NOTES
Assessment/Plan:    12 weeks gestation of pregnancy  Estimated 12 week gestational pregnancy  Patient has not completed prenatal blood work at this time  Will reprint  Will send patient for dating ultrasound  Continue prenatal vitamins  Refill peridoxine for morning sickness  Follow-up in 1 month        Chronic lower back pain  Controlled with Flexeril  Continue current medications    Insomnia  Patient states her insomnia is stemming from nausea  Will prescribe patient with 1 month worth of pyridoxine  Return if symptom worsens    Headache, chronic migraine without aura  Patient takes Tylenol twice a day for headache  States she is currently having headache, 6/10  Patient does follow with Neurology, next appointment in February 2020  Will recommend patient to increase her Tylenol to 650 mg 3 times a day  Do not take more than 3000mg in a 24 hour as this is dangerous to your liver      Subjective:      Patient ID: Lalito Cook is a 20 y o  female  HPI    74-year-old female patient with past medical history significant for 1st time pregnancy, initially seen 11/25/2019 here for disability paperwork  Patient was last seen by Dr Osorio for disability paperwork but is requesting additional information due to some areas of not applicable in her origin paperwork  Patient states she has not decided to keep the baby rather than going through with termination  Patient states she has good support from her boyfriend as well as her family  Patient has not completed her prenatal blood work at this time  Patient will be interested in following with [facility] for her prenatal care  Patient states she had some minimal spotting with some yellow discharge  Patient denies any vaginal itching, pain, discomfort  Patient does have some occasional cramping  Patient denies feeling any movement  Patient denies any tobacco use, alcohol use  Patient still have been adherent with her medication    Patient states her paradox in did help with her nausea but she has run out  Patient is following behavior therapist for her depression, schedule appointment tomorrow  Patient is 165 lb today, patient was 164 lb on the visit 12/09/2019, 156 lb in 11/25/2019  Review of Systems   Constitutional: Positive for appetite change  Negative for chills and fever  Decreased appetite   HENT: Positive for rhinorrhea  Negative for congestion  Respiratory: Negative for shortness of breath  Cardiovascular: Negative for chest pain  Gastrointestinal: Positive for diarrhea, nausea and vomiting  Negative for abdominal pain and constipation  Diarrhea from baseline IBS  Vomiting 3 to 4 times a day   Genitourinary: Positive for vaginal bleeding and vaginal discharge  Negative for dysuria and vaginal pain  Spotting with yellow discharge   Musculoskeletal: Positive for back pain  Baseline back pain   Skin: Negative for rash  Allergic/Immunologic: Negative for environmental allergies and food allergies  Neurological: Positive for headaches  Psychiatric/Behavioral: Positive for sleep disturbance  Negative for self-injury and suicidal ideas  Objective:    /72 (BP Location: Left arm, Patient Position: Sitting, Cuff Size: Standard)   Pulse 82   Temp (!) 97 4 °F (36 3 °C) (Tympanic)   Resp 18   Ht 5' 2 1" (1 577 m)   Wt 74 8 kg (165 lb)   BMI 30 08 kg/m²       Physical Exam   Constitutional: She is oriented to person, place, and time  She appears well-developed and well-nourished  No distress  HENT:   Head: Normocephalic  Nose: Nose normal    Mouth/Throat: No oropharyngeal exudate  Eyes: EOM are normal  Right eye exhibits no discharge  Left eye exhibits no discharge  Neck: Neck supple  Cardiovascular: Normal rate and regular rhythm  No murmur heard  Pulmonary/Chest: Effort normal and breath sounds normal  No respiratory distress  Abdominal: Soft   Bowel sounds are normal  She exhibits no distension  Musculoskeletal: She exhibits no edema, tenderness or deformity  Neurological: She is alert and oriented to person, place, and time  Skin: Skin is warm and dry  Capillary refill takes less than 2 seconds  No rash noted  She is not diaphoretic  No erythema  No pallor  Psychiatric:   Mildly depressed mood   Vitals reviewed  MADIHA Arambula    Family Medicine, PGY-2

## 2020-02-06 ENCOUNTER — HOSPITAL ENCOUNTER (EMERGENCY)
Facility: HOSPITAL | Age: 20
Discharge: HOME | End: 2020-02-06
Attending: EMERGENCY MEDICINE | Admitting: EMERGENCY MEDICINE
Payer: COMMERCIAL

## 2020-02-06 VITALS
DIASTOLIC BLOOD PRESSURE: 74 MMHG | TEMPERATURE: 98 F | HEART RATE: 96 BPM | OXYGEN SATURATION: 99 % | SYSTOLIC BLOOD PRESSURE: 131 MMHG | RESPIRATION RATE: 18 BRPM | WEIGHT: 161.82 LBS | BODY MASS INDEX: 29.5 KG/M2

## 2020-02-06 DIAGNOSIS — R07.89 CHEST WALL PAIN: Primary | ICD-10-CM

## 2020-02-06 DIAGNOSIS — R05.9 COUGH: ICD-10-CM

## 2020-02-06 LAB
ATRIAL RATE: 78 BPM
P AXIS: 70 DEGREES
PR INTERVAL: 136 MS
QRS AXIS: 70 DEGREES
QRSD INTERVAL: 80 MS
QT INTERVAL: 388 MS
QTC INTERVAL: 442 MS
T WAVE AXIS: 56 DEGREES
VENTRICULAR RATE: 78 BPM

## 2020-02-06 PROCEDURE — 93010 ELECTROCARDIOGRAM REPORT: CPT | Performed by: INTERNAL MEDICINE

## 2020-02-06 PROCEDURE — 99283 EMERGENCY DEPT VISIT LOW MDM: CPT | Performed by: EMERGENCY MEDICINE

## 2020-02-06 PROCEDURE — 99284 EMERGENCY DEPT VISIT MOD MDM: CPT

## 2020-02-06 PROCEDURE — 93005 ELECTROCARDIOGRAM TRACING: CPT

## 2020-02-06 RX ORDER — NAPROXEN 500 MG/1
500 TABLET ORAL 2 TIMES DAILY PRN
Qty: 14 TABLET | Refills: 0 | Status: SHIPPED | OUTPATIENT
Start: 2020-02-06 | End: 2020-11-17

## 2020-02-06 RX ORDER — CETIRIZINE HYDROCHLORIDE 10 MG/1
10 TABLET ORAL DAILY
Qty: 30 TABLET | Refills: 0 | Status: SHIPPED | OUTPATIENT
Start: 2020-02-06 | End: 2020-11-17

## 2020-02-06 NOTE — ED PROVIDER NOTES
History  Chief Complaint   Patient presents with    Chest Pain     patient started with chest pain 4 days ago  c/o midsternal sharp chest pain with SOB  denies n/v/d  c/o dry cough as well  19 yo female c/o localized area of sharp chest pain on the right sternal border, after onset of cough about 4 days ago, with some sensation of chest congestion, ear pressure, no fever  Cough is dry nonproductive  No sore throat      History provided by:  Patient  URI   Presenting symptoms: congestion and cough    Presenting symptoms: no fever and no sore throat    Congestion:     Location:  Chest  Severity:  Moderate  Duration:  3 days  Timing:  Sporadic  Progression:  Waxing and waning  Chronicity:  New  Relieved by:  Certain positions  Worsened by:  Breathing      Prior to Admission Medications   Prescriptions Last Dose Informant Patient Reported? Taking?    Pyridoxine HCl (VITAMIN B-6) 25 MG tablet   No No   Sig: Take 1 tablet (25 mg total) by mouth 3 (three) times a day   cyclobenzaprine (FLEXERIL) 5 mg tablet  Self No No   Si-2 tabs  As needed for her neck pain and back pain   cyproheptadine (PERIACTIN) 4 mg tablet  Self No No   Sig: Quarter tab at bedtime, increase up slowly to 1 tab at bedtime daily   venlafaxine (EFFEXOR-XR) 150 mg 24 hr capsule  Self No No   Sig: Take 1 capsule (150 mg total) by mouth daily      Facility-Administered Medications: None       Past Medical History:   Diagnosis Date    Depression     last assessed-2014    Finger injury     last assessed-2014    Gastroenteritis     Myalgia     and myositis    No known health problems     Polyuria     last assessed-11/15/2013    Viral gastroenteritis     last assessed-3/4/2013       Past Surgical History:   Procedure Laterality Date    NO PAST SURGERIES         Family History   Problem Relation Age of Onset    Hypertension Mother     Cancer Maternal Grandmother     Cancer Maternal Grandfather      I have reviewed and agree with the history as documented  Social History     Tobacco Use    Smoking status: Never Smoker    Smokeless tobacco: Never Used   Substance Use Topics    Alcohol use: No    Drug use: No        Review of Systems   Constitutional: Negative for fever  HENT: Positive for congestion  Negative for sore throat  Respiratory: Positive for cough  All other systems reviewed and are negative  Physical Exam  Physical Exam   Pulmonary/Chest:       Nursing note and vitals reviewed        Vital Signs  ED Triage Vitals [02/06/20 1531]   Temperature Pulse Respirations Blood Pressure SpO2   98 °F (36 7 °C) 96 18 131/74 99 %      Temp Source Heart Rate Source Patient Position - Orthostatic VS BP Location FiO2 (%)   Oral Monitor Sitting Right arm --      Pain Score       7           Vitals:    02/06/20 1531   BP: 131/74   Pulse: 96   Patient Position - Orthostatic VS: Sitting         Visual Acuity      ED Medications  Medications - No data to display    Diagnostic Studies  Results Reviewed     None                 No orders to display              Procedures  ECG 12 Lead Documentation Only  Date/Time: 2/6/2020 4:06 PM  Performed by: Fawn Marr MD  Authorized by: Fawn Marr MD     Rate:     ECG rate:  78    ECG rate assessment: normal    Rhythm:     Rhythm: sinus rhythm    Ectopy:     Ectopy: none    QRS:     QRS axis:  Normal    QRS intervals:  Normal  Conduction:     Conduction: normal    ST segments:     ST segments:  Normal  T waves:     T waves: normal               ED Course               PERC Rule for PE      Most Recent Value   PERC Rule for PE   Age >=50  0 Filed at: 02/06/2020 1607   HR >=100  0 Filed at: 02/06/2020 1607   O2 Sat on room air < 95%  0 Filed at: 02/06/2020 1607   History of PE or DVT  0 Filed at: 02/06/2020 1607   Recent trauma or surgery  0 Filed at: 02/06/2020 1607   Hemoptysis  0 Filed at: 02/06/2020 1607   Exogenous estrogen  0 Filed at: 02/06/2020 1607   Unilateral leg swelling 0 Filed at: 02/06/2020 1607   [address] for PE Results  0 Filed at: 02/06/2020 1607                      [facility]      Disposition  Final diagnoses:   Chest wall pain   Cough - probable acute URI     Time reflects when diagnosis was documented in both MDM as applicable and the Disposition within this note     Time User Action Codes Description Comment    2/6/2020  4:08 PM Vauxhall Sero Add [R07 89] Chest wall pain     2/6/2020  4:09 PM Estuardo Sero Add [R05] Cough     2/6/2020  4:09 PM Estuardo Sero Modify [R05] Cough probable acute URI      ED Disposition     ED Disposition Condition Date/Time Comment    Discharge Good Thu Feb 6, 2020  4:09 PM Magi Ferrer discharge to home/self care              Follow-up Information     Follow up With Specialties Details Why Contact Info    [employer]  Call  For followup [phone]            Discharge Medication List as of 2/6/2020  4:10 PM      START taking these medications    Details   cetirizine (ZyrTEC) 10 mg tablet Take 1 tablet (10 mg total) by mouth daily, Starting Thu 2/6/2020, Until Fri 2/5/2021, Print      dextromethorphan-guaifenesin (MUCINEX DM)  MG per 12 hr tablet Take 1 tablet by mouth every 12 (twelve) hours as needed for cough, Starting Thu 2/6/2020, Print      naproxen (NAPROSYN) 500 mg tablet Take 1 tablet (500 mg total) by mouth 2 (two) times a day as needed for mild pain or moderate pain for up to 14 doses, Starting Thu 2/6/2020, Print         CONTINUE these medications which have NOT CHANGED    Details   cyclobenzaprine (FLEXERIL) 5 mg tablet 1-2 tabs  As needed for her neck pain and back pain, Normal      cyproheptadine (PERIACTIN) 4 mg tablet Quarter tab at bedtime, increase up slowly to 1 tab at bedtime daily, Normal      Pyridoxine HCl (VITAMIN B-6) 25 MG tablet Take 1 tablet (25 mg total) by mouth 3 (three) times a day, Starting Mon 1/13/2020, Normal      venlafaxine (EFFEXOR-XR) 150 mg 24 hr capsule Take 1 capsule (150 mg total) by mouth daily, Starting Mon 7/1/2019, Normal           No discharge procedures on file      ED Provider  Electronically Signed by           Shari Quick MD  02/06/20 6962

## 2020-02-14 ENCOUNTER — TELEPHONE (OUTPATIENT)
Dept: FAMILY MEDICINE CLINIC | Facility: CLINIC | Age: 20
End: 2020-02-14

## 2020-02-14 NOTE — TELEPHONE ENCOUNTER
Dr Letty Coley called from [address] [place] [address] for a Network Medical Review  About [employer] Disability   He would like to speak to Dr Colten Garnett whom the patient saw back in december

## 2020-02-17 NOTE — TELEPHONE ENCOUNTER
Called Dr Mejia's number at [phone], voicemail, left message stating I will contact him again at at later time

## 2020-02-19 ENCOUNTER — TELEPHONE (OUTPATIENT)
Dept: FAMILY MEDICINE CLINIC | Facility: CLINIC | Age: 20
End: 2020-02-19

## 2020-02-19 NOTE — TELEPHONE ENCOUNTER
Received correspondence from [employer] in regards to patient applying for disability benefits  Please check your bin

## 2020-02-19 NOTE — TELEPHONE ENCOUNTER
Called Dr Mejia's number again at [phone], received answering machine  Will try again later this afternoon

## 2020-02-20 ENCOUNTER — OFFICE VISIT (OUTPATIENT)
Dept: NEUROLOGY | Facility: CLINIC | Age: 20
End: 2020-02-20
Payer: COMMERCIAL

## 2020-02-20 VITALS
HEART RATE: 91 BPM | WEIGHT: 158 LBS | SYSTOLIC BLOOD PRESSURE: 114 MMHG | BODY MASS INDEX: 29.08 KG/M2 | DIASTOLIC BLOOD PRESSURE: 74 MMHG | HEIGHT: 62 IN

## 2020-02-20 DIAGNOSIS — G89.29 CHRONIC BILATERAL LOW BACK PAIN, UNSPECIFIED WHETHER SCIATICA PRESENT: ICD-10-CM

## 2020-02-20 DIAGNOSIS — G43.709 CHRONIC MIGRAINE WITHOUT AURA WITHOUT STATUS MIGRAINOSUS, NOT INTRACTABLE: Primary | ICD-10-CM

## 2020-02-20 DIAGNOSIS — M54.50 CHRONIC BILATERAL LOW BACK PAIN, UNSPECIFIED WHETHER SCIATICA PRESENT: ICD-10-CM

## 2020-02-20 PROCEDURE — 99214 OFFICE O/P EST MOD 30 MIN: CPT | Performed by: PHYSICIAN ASSISTANT

## 2020-02-20 PROCEDURE — 1036F TOBACCO NON-USER: CPT | Performed by: PHYSICIAN ASSISTANT

## 2020-02-20 RX ORDER — RIZATRIPTAN BENZOATE 10 MG/1
10 TABLET ORAL AS NEEDED
Qty: 9 TABLET | Refills: 0 | Status: SHIPPED | OUTPATIENT
Start: 2020-02-20 | End: 2020-03-27

## 2020-02-20 RX ORDER — GABAPENTIN 300 MG/1
300 CAPSULE ORAL 2 TIMES DAILY
Qty: 60 CAPSULE | Refills: 2 | Status: SHIPPED | OUTPATIENT
Start: 2020-02-20 | End: 2020-04-10

## 2020-02-20 RX ORDER — OLANZAPINE 5 MG/1
5 TABLET ORAL
Qty: 5 TABLET | Refills: 0 | Status: SHIPPED | OUTPATIENT
Start: 2020-02-20 | End: 2020-04-10

## 2020-02-20 RX ORDER — DEXAMETHASONE 1 MG
1 TABLET ORAL
Qty: 5 TABLET | Refills: 0 | Status: SHIPPED | OUTPATIENT
Start: 2020-02-20 | End: 2020-11-17

## 2020-02-20 NOTE — PROGRESS NOTES
Review of Systems   Constitutional: Negative  HENT: Negative  Eyes: Positive for photophobia  Respiratory: Negative  Cardiovascular: Negative  Gastrointestinal: Negative  Endocrine: Negative  Genitourinary: Negative  Musculoskeletal: Negative  Skin: Negative  Allergic/Immunologic: Negative  Neurological: Positive for headaches  Hematological: Negative  Psychiatric/Behavioral: Positive for sleep disturbance (difficulty staying asleep )

## 2020-02-20 NOTE — ASSESSMENT & PLAN NOTE
Restart gabapentin 300mg at bedtime for next 3 nights then twice a day  Continue Cyclobenzaprine 5 mg at bedtime  Continue Cyproheptadine 4 mg at bedtime    At onset of migraine, take rizatriptan  May repeat in 2 hours if needed  Limit of 3 a week or 9 a month  If you take second dose of rizatriptan, take prochlorperazine 10 mg   Limit of 10 a month    For next 5 days:   Take Dexamethasone 1 mg in am and Olanzapine at bedtime

## 2020-02-20 NOTE — PATIENT INSTRUCTIONS
Restart gabapentin 300mg at bedtime for next 3 nights then twice a day  Continue Cyclobenzaprine 5 mg at bedtime  Continue Cyproheptadine 4 mg at bedtime    At onset of migraine, take rizatriptan  May repeat in 2 hours if needed  Limit of 3 a week or 9 a month  If you take second dose of rizatriptan, take prochlorperazine 10 mg   Limit of 10 a month    For next 5 days: Take Dexamethasone 1 mg in am and Olanzapine at bedtime    Referral for PT     Neck pain:   - We discussed the role of her neck pathology and poor posture, with straightening of the normal cervical lordosis, in her headaches  We discussed how tightening of the neck muscles can irritate the nerves in the occipital region of her head and cause or worsen head pain  We also discussed and demonstrated neck strengthening and relaxation exercises, as well as giving written instructions on these exercises  - We talked about the importance of good posture for improving her shoulder, neck, and head pain  The patient was given visualization exercises for correcting posture, which she will practice at home  If this simple exercise does not help improve the posture, we will consider formal physical therapy in the future  Medication overuse headaches:   - We discussed medication overuse headache [facility]) and how to avoid it in the future  It was explained that all analgesics have the potential to cause medication overuse headache [facility]) and analgesic overuse can negate the effectiveness of headache preventive measures  After successful [address] treatment, preventive medications for an underlying primary headache disorder have a greater chance for success  Avoid medications with narcotics, barbiturates, or caffeine in them as these can cause rebound headaches after very few doses and can interfere with other headache medicine efficacy  Taking any analgesics for more than 2-3 days a week can cause medication overuse headache       Reproductive age women: Should take folic acid daily when taking anti-seizure drugs especially Depakote  Over-the-counter supplements: to decrease intensity and frequency of migraines  - Magnesium Oxide 400-500 mg a day  If any diarrhea or upset stomach, decrease dose  as tolerated  -  B2 200 mg twice a day  This supplement will change the color of the urine to fluorescent yellow no matter how hydrated, which is normal       Headache management instructions  - When patient has a moderate to severe headache, they should seek rest, initiate relaxation and apply cold compresses to the head  - Maintain regular sleep schedule  Adults need at least 7-8 hours of uninterrupted a night  - Limit over the counter medications such as Tylenol, Ibuprofen, Aleve, Excedrin  (No more than 3 times a week)  - Maintain headache diary  We discussed an DESTINY for a smart phone is "Migraine min"  - Limit caffeine to 1-2 cups 8 to 16 oz a day or less  - Avoid dietary trigger  (aged cheese, peanuts, MSG, aspartame and nitrates)  - Patient is to have regular frequent meals to prevent headache onset  - Please drink at least 64 ounces of water a day to help remain hydrated  Please call with any questions or concerns   Office number is [phone]
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
## 2020-03-27 DIAGNOSIS — G43.709 CHRONIC MIGRAINE WITHOUT AURA WITHOUT STATUS MIGRAINOSUS, NOT INTRACTABLE: ICD-10-CM

## 2020-03-27 RX ORDER — RIZATRIPTAN BENZOATE 10 MG/1
TABLET ORAL
Qty: 9 TABLET | Refills: 0 | Status: SHIPPED | OUTPATIENT
Start: 2020-03-27 | End: 2020-11-17

## 2020-04-10 ENCOUNTER — TELEMEDICINE (OUTPATIENT)
Dept: FAMILY MEDICINE CLINIC | Facility: CLINIC | Age: 20
End: 2020-04-10

## 2020-04-10 VITALS — HEIGHT: 62 IN | BODY MASS INDEX: 28.52 KG/M2 | WEIGHT: 155 LBS

## 2020-04-10 DIAGNOSIS — R21 RASH: Primary | ICD-10-CM

## 2020-04-10 PROCEDURE — 3008F BODY MASS INDEX DOCD: CPT | Performed by: PHYSICIAN ASSISTANT

## 2020-04-10 PROCEDURE — 99213 OFFICE O/P EST LOW 20 MIN: CPT | Performed by: FAMILY MEDICINE

## 2020-04-10 PROCEDURE — T1015 CLINIC SERVICE: HCPCS | Performed by: FAMILY MEDICINE

## 2020-04-10 PROCEDURE — 3008F BODY MASS INDEX DOCD: CPT | Performed by: FAMILY MEDICINE
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
## 2020-10-26 ENCOUNTER — TELEMEDICINE (OUTPATIENT)
Dept: FAMILY MEDICINE CLINIC | Facility: CLINIC | Age: 20
End: 2020-10-26

## 2020-10-26 DIAGNOSIS — U07.1 COVID-19 VIRUS INFECTION: Primary | ICD-10-CM

## 2020-10-26 DIAGNOSIS — U07.1 COVID-19 VIRUS INFECTION: ICD-10-CM

## 2020-10-26 PROCEDURE — U0003 INFECTIOUS AGENT DETECTION BY NUCLEIC ACID (DNA OR RNA); SEVERE ACUTE RESPIRATORY SYNDROME CORONAVIRUS 2 (SARS-COV-2) (CORONAVIRUS DISEASE [COVID-19]), AMPLIFIED PROBE TECHNIQUE, MAKING USE OF HIGH THROUGHPUT TECHNOLOGIES AS DESCRIBED BY CMS-2020-01-R: HCPCS | Performed by: FAMILY MEDICINE

## 2020-10-26 PROCEDURE — 99213 OFFICE O/P EST LOW 20 MIN: CPT | Performed by: FAMILY MEDICINE

## 2020-10-27 LAB — SARS-COV-2 RNA SPEC QL NAA+PROBE: NOT DETECTED

## 2020-10-28 ENCOUNTER — TELEPHONE (OUTPATIENT)
Dept: URGENT CARE | Facility: MEDICAL CENTER | Age: 20
End: 2020-10-28

## 2020-10-28 ENCOUNTER — DOCUMENTATION (OUTPATIENT)
Dept: URGENT CARE | Facility: MEDICAL CENTER | Age: 20
End: 2020-10-28

## 2020-11-10 ENCOUNTER — TELEPHONE (OUTPATIENT)
Dept: FAMILY MEDICINE CLINIC | Facility: CLINIC | Age: 20
End: 2020-11-10

## 2020-11-12 ENCOUNTER — TELEMEDICINE (OUTPATIENT)
Dept: FAMILY MEDICINE CLINIC | Facility: CLINIC | Age: 20
End: 2020-11-12

## 2020-11-12 DIAGNOSIS — J02.9 SORE THROAT: Primary | ICD-10-CM

## 2020-11-12 DIAGNOSIS — B34.9 VIRAL ILLNESS: ICD-10-CM

## 2020-11-12 PROCEDURE — 1036F TOBACCO NON-USER: CPT | Performed by: FAMILY MEDICINE

## 2020-11-12 PROCEDURE — 99213 OFFICE O/P EST LOW 20 MIN: CPT | Performed by: FAMILY MEDICINE

## 2020-11-17 ENCOUNTER — OFFICE VISIT (OUTPATIENT)
Dept: OBGYN CLINIC | Facility: CLINIC | Age: 20
End: 2020-11-17
Payer: COMMERCIAL

## 2020-11-17 VITALS
OXYGEN SATURATION: 99 % | WEIGHT: 135.2 LBS | HEART RATE: 88 BPM | SYSTOLIC BLOOD PRESSURE: 110 MMHG | BODY MASS INDEX: 23.96 KG/M2 | TEMPERATURE: 97.1 F | DIASTOLIC BLOOD PRESSURE: 82 MMHG | HEIGHT: 63 IN

## 2020-11-17 DIAGNOSIS — Z30.09 ENCOUNTER FOR GENERAL COUNSELING AND ADVICE ON CONTRACEPTIVE MANAGEMENT: ICD-10-CM

## 2020-11-17 DIAGNOSIS — N60.12 FIBROCYSTIC BREAST CHANGES OF BOTH BREASTS: Primary | ICD-10-CM

## 2020-11-17 DIAGNOSIS — N60.11 FIBROCYSTIC BREAST CHANGES OF BOTH BREASTS: Primary | ICD-10-CM

## 2020-11-17 PROCEDURE — 3008F BODY MASS INDEX DOCD: CPT | Performed by: FAMILY MEDICINE

## 2020-11-17 PROCEDURE — 99203 OFFICE O/P NEW LOW 30 MIN: CPT | Performed by: NURSE PRACTITIONER

## 2020-11-17 PROCEDURE — 1036F TOBACCO NON-USER: CPT | Performed by: NURSE PRACTITIONER

## 2020-11-17 PROCEDURE — 3008F BODY MASS INDEX DOCD: CPT | Performed by: NURSE PRACTITIONER

## 2020-12-04 ENCOUNTER — TELEPHONE (OUTPATIENT)
Dept: OBGYN CLINIC | Facility: CLINIC | Age: 20
End: 2020-12-04

## 2020-12-04 DIAGNOSIS — Z30.011 ORAL CONTRACEPTION INITIAL PRESCRIPTION: Primary | ICD-10-CM

## 2020-12-07 RX ORDER — ACETAMINOPHEN AND CODEINE PHOSPHATE 120; 12 MG/5ML; MG/5ML
1 SOLUTION ORAL DAILY
Qty: 84 TABLET | Refills: 4 | Status: SHIPPED | OUTPATIENT
Start: 2020-12-07 | End: 2021-04-16

## 2020-12-22 ENCOUNTER — CLINICAL SUPPORT (OUTPATIENT)
Dept: OBGYN CLINIC | Facility: CLINIC | Age: 20
End: 2020-12-22
Payer: COMMERCIAL

## 2020-12-22 DIAGNOSIS — Z23 NEED FOR HPV VACCINATION: Primary | ICD-10-CM

## 2020-12-22 PROCEDURE — 90651 9VHPV VACCINE 2/3 DOSE IM: CPT

## 2020-12-22 PROCEDURE — 90471 IMMUNIZATION ADMIN: CPT

## 2021-02-16 ENCOUNTER — TELEMEDICINE (OUTPATIENT)
Dept: FAMILY MEDICINE CLINIC | Facility: CLINIC | Age: 21
End: 2021-02-16

## 2021-02-16 DIAGNOSIS — Z20.822 EXPOSURE TO COVID-19 VIRUS: Primary | ICD-10-CM

## 2021-02-16 PROCEDURE — 99213 OFFICE O/P EST LOW 20 MIN: CPT | Performed by: FAMILY MEDICINE

## 2021-02-16 RX ORDER — IBUPROFEN 400 MG/1
600 TABLET ORAL EVERY 6 HOURS PRN
Qty: 30 TABLET | Refills: 0 | Status: SHIPPED | OUTPATIENT
Start: 2021-02-16

## 2021-02-16 NOTE — PROGRESS NOTES
COVID-19 Virtual Visit     Assessment/Plan:    Problem List Items Addressed This Visit        Other    Exposure to COVID-19 virus - Primary     - Symptoms began 02/07, exposed on 02/05, day #9   - Current symptoms: as listed below  - Sent for COVID testing at this time  - Advised the patient to quarantine, wear a mask around others, continue social distancing, avoid sharing personal devices, and wiping down common surfaces after use  - Reviewed ED precautions  - Follow-up: Friday virtually           Relevant Medications    ibuprofen (MOTRIN) 400 mg tablet    Other Relevant Orders    Novel Coronavirus (Covid-19),PCR [facility] - Collected at [employer] or [facility]         Disposition:     I referred patient to one of our centralized sites for a COVID-19 swab  I have spent 18 minutes directly with the patient  Encounter provider Samanta Armas MD    Provider located at [address] [address] [phone]    Recent Visits  No visits were found meeting these conditions  Showing recent visits within past 7 days and meeting all other requirements     Today's Visits  Date Type Provider Dept   02/16/21 Telemedicine Samanta Armas MD [facility] today's visits and meeting all other requirements     Future Appointments  No visits were found meeting these conditions  Showing future appointments within next 150 days and meeting all other requirements      This virtual check-in was done via [employer] Teams and patient was informed that this is a secure, HIPAA-compliant platform  She agrees to proceed  Patient agrees to participate in a virtual check in via telephone or video visit instead of presenting to the office to address urgent/immediate medical needs  Patient is aware this is a billable service  After connecting through [facility], the patient was identified by name and date of birth   Amanda Hermann was informed that this was a telemedicine visit and that the exam was being conducted confidentially over secure lines  My office door was closed  No one else was in the room  Lalito Cook acknowledged consent and understanding of privacy and security of the telemedicine visit  I informed the patient that I have reviewed her record in Epic and presented the opportunity for her to ask any questions regarding the visit today  The patient agreed to participate  Subjective:   Lalito Cook is a 23 y o  female who is concerned about COVID-19  Patient's symptoms include fever, chills, fatigue, nasal congestion, rhinorrhea, sore throat, cough, shortness of breath (mostly associated with activity), nausea, diarrhea and headache  Patient denies malaise, anosmia, loss of taste, chest tightness, abdominal pain, vomiting and myalgias       Date of symptom onset: 2/7/2021  Date of exposure: 2/5/2021    Exposure:   Contact with a person who is under investigation (PUI) for or who is positive for COVID-19 within the last 14 days?: Yes    Hospitalized recently for fever and/or lower respiratory symptoms?: No      Currently a healthcare worker that is involved in direct patient care?: No      Works in a special setting where the risk of COVID-19 transmission may be high? (this may include long-term care, correctional and FPC facilities; homeless shelters; assisted-living facilities and group homes ): No      Resident in a special setting where the risk of COVID-19 transmission may be high? (this may include long-term care, correctional and FPC facilities; homeless shelters; assisted-living facilities and group homes ): No      Lab Results   Component Value Date    Deborrah Stinson Not Detected 10/26/2020     Past Medical History:   Diagnosis Date    Chronic migraine without aura     sees neurology    Depression     last assessed-9/5/2014    Finger injury     last assessed-8/16/2014    Gastroenteritis     Myalgia and myositis    Polyuria     last assessed-11/15/2013    Viral gastroenteritis     last assessed-3/4/2013     Past Surgical History:   Procedure Laterality Date    NO PAST SURGERIES       Current Outpatient Medications   Medication Sig Dispense Refill    ibuprofen (MOTRIN) 400 mg tablet Take 1 5 tablets (600 mg total) by mouth every 6 (six) hours as needed for mild pain or moderate pain 30 tablet 0    norethindrone (MICRONOR) 0 35 MG tablet Take 1 tablet (0 35 mg total) by mouth daily 84 tablet 4     No current facility-administered medications for this visit  No Known Allergies    Review of Systems   Constitutional: Positive for chills, fatigue and fever  HENT: Positive for congestion, rhinorrhea and sore throat  Respiratory: Positive for cough and shortness of breath (mostly associated with activity)  Negative for chest tightness  Gastrointestinal: Positive for diarrhea and nausea  Negative for abdominal pain and vomiting  Musculoskeletal: Negative for myalgias  Neurological: Positive for headaches  Objective: There were no vitals filed for this visit  Physical Exam  Constitutional:       Appearance: Normal appearance  She is normal weight  HENT:      Head: Normocephalic and atraumatic  Mouth/Throat:      Mouth: Mucous membranes are moist    Eyes:      Extraocular Movements: Extraocular movements intact  Conjunctiva/sclera: Conjunctivae normal    Neurological:      Mental Status: She is alert  Psychiatric:         Mood and Affect: Mood normal        VIRTUAL VISIT DISCLAIMER    Magi Ferrer acknowledges that she has consented to an online visit or consultation  She understands that the online visit is based solely on information provided by her, and that, in the absence of a face-to-face physical evaluation by the physician, the diagnosis she receives is both limited and provisional in terms of accuracy and completeness   This is not intended to replace a full medical face-to-face evaluation by the physician  Harsh Mcdermott understands and accepts these terms

## 2021-02-16 NOTE — ASSESSMENT & PLAN NOTE
- Symptoms began 02/07, exposed on 02/05, day #9   - Current symptoms: as listed below  - Sent for COVID testing at this time  - Advised the patient to quarantine, wear a mask around others, continue social distancing, avoid sharing personal devices, and wiping down common surfaces after use  - Reviewed ED precautions  - Follow-up: Friday virtually

## 2021-02-17 ENCOUNTER — TELEPHONE (OUTPATIENT)
Dept: FAMILY MEDICINE CLINIC | Facility: CLINIC | Age: 21
End: 2021-02-17

## 2021-02-17 NOTE — TELEPHONE ENCOUNTER
----- Message from Wil Bardales MD sent at 2/16/2021  5:20 PM EST -----  Hi,    Please schedule this patient for a virtual COVID visit with me on Friday      Thank you,    Wil Bardales MD

## 2021-02-17 NOTE — TELEPHONE ENCOUNTER
----- Message from Jonathan Solis MD sent at 2/16/2021  5:20 PM EST -----  Hi,    Please schedule this patient for a virtual COVID visit with me on Friday      Thank you,    Jonathan Solis MD

## 2021-02-18 DIAGNOSIS — Z20.822 EXPOSURE TO COVID-19 VIRUS: ICD-10-CM

## 2021-02-18 PROCEDURE — U0005 INFEC AGEN DETEC AMPLI PROBE: HCPCS | Performed by: FAMILY MEDICINE

## 2021-02-18 PROCEDURE — U0003 INFECTIOUS AGENT DETECTION BY NUCLEIC ACID (DNA OR RNA); SEVERE ACUTE RESPIRATORY SYNDROME CORONAVIRUS 2 (SARS-COV-2) (CORONAVIRUS DISEASE [COVID-19]), AMPLIFIED PROBE TECHNIQUE, MAKING USE OF HIGH THROUGHPUT TECHNOLOGIES AS DESCRIBED BY CMS-2020-01-R: HCPCS | Performed by: FAMILY MEDICINE

## 2021-02-19 ENCOUNTER — TELEMEDICINE (OUTPATIENT)
Dept: FAMILY MEDICINE CLINIC | Facility: CLINIC | Age: 21
End: 2021-02-19

## 2021-02-19 DIAGNOSIS — Z20.822 EXPOSURE TO COVID-19 VIRUS: Primary | ICD-10-CM

## 2021-02-19 LAB — SARS-COV-2 RNA RESP QL NAA+PROBE: NEGATIVE

## 2021-02-19 PROCEDURE — 99441 PR PHYS/QHP TELEPHONE EVALUATION 5-10 MIN: CPT | Performed by: FAMILY MEDICINE

## 2021-02-19 RX ORDER — BENZONATATE 100 MG/1
100 CAPSULE ORAL 3 TIMES DAILY PRN
Qty: 20 CAPSULE | Refills: 0 | Status: SHIPPED | OUTPATIENT
Start: 2021-02-19

## 2021-02-19 NOTE — ASSESSMENT & PLAN NOTE
- Symptoms began 02/07, exposed on 02/05, day #11   - Current symptoms: as listed below, not getting any better  - COVID negative  - Will order Tessalon Perles for dry cough, advised pt to continue Motrin as needed  - Advised the patient to quarantine, wear a mask around others, continue social distancing, avoid sharing personal devices, and wiping down common surfaces after use  - Reviewed ED precautions  - Follow-up: Monday virtually

## 2021-02-19 NOTE — PROGRESS NOTES
COVID-19 Virtual Visit     Assessment/Plan:    Problem List Items Addressed This Visit        Other    Exposure to COVID-19 virus - Primary     - Symptoms began 02/07, exposed on 02/05, day #11   - Current symptoms: as listed below, not getting any better  - COVID negative  - Will order Tessalon Perles for dry cough, advised pt to continue Motrin as needed  - Advised the patient to quarantine, wear a mask around others, continue social distancing, avoid sharing personal devices, and wiping down common surfaces after use  - Reviewed ED precautions  - Follow-up: Monday virtually           Relevant Medications    benzonatate (TESSALON PERLES) 100 mg capsule         Disposition:     I recommended continued isolation until at least 24 hours have passed since recovery defined as resolution of fever without the use of fever-reducing medications AND improvement in COVID symptoms AND 10 days have passed since onset of symptoms (or 10 days have passed since date of first positive viral diagnostic test for asymptomatic patients)  I have spent 7 minutes directly with the patient  Encounter provider Rene Ortiz MD    Provider located at [address] [place] [address]   [phone]    Recent Visits  Date Type Provider Dept   02/17/21 Telephone MD Andrea Cleveland   02/17/21 Telephone Lavon Alcaraz   02/16/21 Telemedicine MD Andrea Cleveland   Showing recent visits within past 7 days and meeting all other requirements     Today's Visits  Date Type Provider Dept   02/19/21 Telemedicine MD Andrea Cleveland   Showing today's visits and meeting all other requirements     Future Appointments  No visits were found meeting these conditions     Showing future appointments within next 150 days and meeting all other requirements        Patient agrees to participate in a virtual check in via telephone or video visit instead of presenting to the office to address urgent/immediate medical needs  Patient is aware this is a billable service  After connecting through Telephone, the patient was identified by name and date of birth  Mimi Hernadez was informed that this was a telemedicine visit and that the exam was being conducted confidentially over secure lines  My office door was closed  The patient was notified the following individuals were present in the room: medical student  Mimi Hernadez acknowledged consent and understanding of privacy and security of the telemedicine visit  I informed the patient that I have reviewed her record in Epic and presented the opportunity for her to ask any questions regarding the visit today  The patient agreed to participate  Subjective:   Mimi Hernadez is a 23 y o  female who has been screened for COVID-19  Symptom change since last report: unchanged  Patient's symptoms include chills, fatigue, sore throat, cough, nausea, diarrhea and headache  Gerardo Mendoza has been staying home and has isolated themselves in her home  She is taking care to not share personal items and is cleaning all surfaces that are touched often, like counters, tabletops, and doorknobs using household cleaning sprays or wipes  She is wearing a mask when she leaves her room  Date of symptom onset: 2/7/2021  Date of exposure: 2/5/2021    Patient is drinking and staying hydrated but she has loss of appetite       Lab Results   Component Value Date    SARSCOV2 Negative 02/18/2021    SARSCOV2 Not Detected 10/26/2020     Past Medical History:   Diagnosis Date    Chronic migraine without aura     sees neurology    Depression     last assessed-9/5/2014    Finger injury     last assessed-8/16/2014    Gastroenteritis     Myalgia     and myositis    Polyuria     last assessed-11/15/2013    Viral gastroenteritis     last assessed-3/4/2013     Past Surgical History: Procedure Laterality Date    NO PAST SURGERIES       Current Outpatient Medications   Medication Sig Dispense Refill    benzonatate (TESSALON PERLES) 100 mg capsule Take 1 capsule (100 mg total) by mouth 3 (three) times a day as needed for cough 20 capsule 0    ibuprofen (MOTRIN) 400 mg tablet Take 1 5 tablets (600 mg total) by mouth every 6 (six) hours as needed for mild pain or moderate pain 30 tablet 0    norethindrone (MICRONOR) 0 35 MG tablet Take 1 tablet (0 35 mg total) by mouth daily 84 tablet 4     No current facility-administered medications for this visit  No Known Allergies    Review of Systems   Constitutional: Positive for chills and fatigue  HENT: Positive for sore throat  Respiratory: Positive for cough  Gastrointestinal: Positive for diarrhea and nausea  Neurological: Positive for headaches  Objective: There were no vitals filed for this visit  It was my intent to perform this visit via video technology but the patient was not able to do a video connection so the visit was completed via audio telephone only  VIRTUAL VISIT DISCLAIMER    Lorna Washington acknowledges that she has consented to an online visit or consultation  She understands that the online visit is based solely on information provided by her, and that, in the absence of a face-to-face physical evaluation by the physician, the diagnosis she receives is both limited and provisional in terms of accuracy and completeness  This is not intended to replace a full medical face-to-face evaluation by the physician  Lorna Washington understands and accepts these terms

## 2021-02-22 ENCOUNTER — CLINICAL SUPPORT (OUTPATIENT)
Dept: OBGYN CLINIC | Facility: CLINIC | Age: 21
End: 2021-02-22
Payer: COMMERCIAL

## 2021-02-22 VITALS — BODY MASS INDEX: 25.33 KG/M2 | WEIGHT: 143 LBS | SYSTOLIC BLOOD PRESSURE: 110 MMHG | DIASTOLIC BLOOD PRESSURE: 66 MMHG

## 2021-02-22 DIAGNOSIS — Z23 NEED FOR HPV VACCINATION: Primary | ICD-10-CM

## 2021-02-22 PROCEDURE — 90471 IMMUNIZATION ADMIN: CPT | Performed by: OBSTETRICS & GYNECOLOGY

## 2021-02-22 PROCEDURE — 96372 THER/PROPH/DIAG INJ SC/IM: CPT | Performed by: OBSTETRICS & GYNECOLOGY

## 2021-02-22 PROCEDURE — 90651 9VHPV VACCINE 2/3 DOSE IM: CPT | Performed by: OBSTETRICS & GYNECOLOGY

## 2021-02-22 RX ORDER — CLINDAMYCIN PHOSPHATE 10 MG/ML
SOLUTION TOPICAL
COMMUNITY
Start: 2021-01-19

## 2021-02-22 NOTE — PROGRESS NOTES
Pt here in office today for her 2nd Francis injection  Pt tolerated injection in left upper arm without any complications, will schedule her 3rd injection today      Wilma Rojas 46: [id number]  LOT: [id number]  EXP: 09/06/2022

## 2021-03-17 ENCOUNTER — OFFICE VISIT (OUTPATIENT)
Dept: URGENT CARE | Age: 21
End: 2021-03-17
Payer: COMMERCIAL

## 2021-03-17 ENCOUNTER — APPOINTMENT (OUTPATIENT)
Dept: RADIOLOGY | Age: 21
End: 2021-03-17
Payer: COMMERCIAL

## 2021-03-17 VITALS
DIASTOLIC BLOOD PRESSURE: 68 MMHG | SYSTOLIC BLOOD PRESSURE: 102 MMHG | OXYGEN SATURATION: 97 % | TEMPERATURE: 96.8 F | HEIGHT: 63 IN | WEIGHT: 138 LBS | HEART RATE: 79 BPM | BODY MASS INDEX: 24.45 KG/M2 | RESPIRATION RATE: 16 BRPM

## 2021-03-17 DIAGNOSIS — S69.92XA INJURY OF FINGER OF LEFT HAND, INITIAL ENCOUNTER: Primary | ICD-10-CM

## 2021-03-17 DIAGNOSIS — S69.92XA INJURY OF FINGER OF LEFT HAND, INITIAL ENCOUNTER: ICD-10-CM

## 2021-03-17 PROCEDURE — G0382 LEV 3 HOSP TYPE B ED VISIT: HCPCS | Performed by: PHYSICIAN ASSISTANT

## 2021-03-17 PROCEDURE — 99203 OFFICE O/P NEW LOW 30 MIN: CPT | Performed by: PHYSICIAN ASSISTANT

## 2021-03-17 PROCEDURE — 99283 EMERGENCY DEPT VISIT LOW MDM: CPT | Performed by: PHYSICIAN ASSISTANT

## 2021-03-17 PROCEDURE — 73140 X-RAY EXAM OF FINGER(S): CPT

## 2021-03-17 NOTE — PATIENT INSTRUCTIONS
Finger Sprain   WHAT YOU NEED TO KNOW:   A finger sprain happens when ligaments in your finger or thumb are stretched or torn  Ligaments are the tough tissues that connect bones  Ligaments allow your hands to grasp and pinch  DISCHARGE INSTRUCTIONS:   Return to the emergency department if:   · The skin on your injured finger looks bluish or pale (less color than normal)  · You have new weakness or numbness in your finger or thumb  It may tingle or burn  · You have a splint that you cannot adjust and it feels too tight  Contact your healthcare provider if:   · You have new or increased swelling or pain in your finger  · You have new or increased stiffness when you move your injured finger  · You have questions or concerns about your injury or treatment  Medicines:   · Pain medicine  may be given  Do not wait until the pain is severe before taking your medicine  · Take your medicine as directed  Call your healthcare provider if you think your medicines are not helping or if you have side effects  Tell him if you take vitamins, herbs, or any other medicines  Keep a written list of your medicines  Include the amounts, and when and why you take them  Bring the list or the pill bottles to follow-up visits  Care for your finger:   · Rest  your finger for at least 48 hours  Do not do activities that cause pain  Return to normal activities as directed  · Apply ice  on your finger to help decrease pain and swelling  Put crushed ice in a plastic bag and cover it with a towel  Put the ice on your injured finger or thumb every hour for 15 to 20 minutes at a time  You may need to ice the area at least 4 to 8 times each day  Ice your finger for as many days as directed  · Elevate your finger  above the level of your heart as often as you can  This will help decrease swelling and pain  You can elevate your hand by resting it on a pillow  · Use a splint or compression as directed    Compression (tight hold) helps support your finger or thumb as it heals  Tape your injured finger to the finger beside it  Severe sprains may be treated with a splint  A splint prevents your finger from moving while it heals  Ask how long you must wear the splint or tape, and how to apply them  · Do exercises as directed  You may be given gentle exercises to begin in a few days  Exercises can help decrease stiffness in your finger or thumb  Exercises also help decrease pain and swelling and improve the movement of your finger or thumb  Check with your healthcare provider before you return to your normal activities or sports  Follow up with your healthcare provider as directed:  Write down any questions you may have to ask at your follow up visits  © Copyright [address] Information is for End User's use only and may not be sold, redistributed or otherwise used for commercial purposes  All illustrations and images included in CareNotes® are the copyrighted property of A D [employer] M [employer]  or [employer]   The above information is an  only  It is not intended as medical advice for individual conditions or treatments  Talk to your doctor, nurse or pharmacist before following any medical regimen to see if it is safe and effective for you

## 2021-03-17 NOTE — PROGRESS NOTES
[facility]        NAME: Ignacio Potter is a 23 y o  female  : 2000    MRN: [id number]  DATE: 2021  TIME: 2:11 PM    Assessment and Plan   Injury of finger of left hand, initial encounter [S69 [id number]]  1  Injury of finger of left hand, initial encounter  XR finger left third digit-middle    CANCELED: XR finger left second digit-index         Patient Instructions     Follow up with PCP in 3-5 days  Proceed to  ER if symptoms worsen  Chief Complaint     Chief Complaint   Patient presents with    Finger Injury     Pt reports injuring her left middle finger when her dog jumped on it  Pt c/o swelling, pain, and bruising in the area along with inability to bend it  History of Present Illness         50-year-old female presents for left 2nd and 3rd finger injuries  Patient states she has a large dog who was being playful with her  She states her fingers accidentally got bent back  She has been experiencing pain, swelling, bruising and difficulty fully moving her fingers for 2 days  She states her 3rd finger is worse than her 2nd  She has been using ibuprofen and ice without relief  Review of Systems   Review of Systems   Constitutional: Negative  Musculoskeletal: Positive for joint swelling  Skin: Positive for color change  Negative for wound  Neurological: Negative for weakness and numbness           Current Medications       Current Outpatient Medications:     benzonatate (TESSALON PERLES) 100 mg capsule, Take 1 capsule (100 mg total) by mouth 3 (three) times a day as needed for cough (Patient not taking: Reported on 2021), Disp: 20 capsule, Rfl: 0    clindamycin (CLEOCIN T) 1 %, APPLY TO FACE TWICE A DAY FOR ACNE, Disp: , Rfl:     hydrocortisone 2 5 % cream, PLEASE SEE ATTACHED FOR DETAILED DIRECTIONS, Disp: , Rfl:     ibuprofen (MOTRIN) 400 mg tablet, Take 1 5 tablets (600 mg total) by mouth every 6 (six) hours as needed for mild pain or moderate pain (Patient not taking: Reported on 2/22/2021), Disp: 30 tablet, Rfl: 0    norethindrone (MICRONOR) 0 35 MG tablet, Take 1 tablet (0 35 mg total) by mouth daily (Patient not taking: Reported on 3/17/2021), Disp: 84 tablet, Rfl: 4    Current Allergies     Allergies as of 03/17/2021    (No Known Allergies)            The following portions of the patient's history were reviewed and updated as appropriate: allergies, current medications, past family history, past medical history, past social history, past surgical history and problem list     Objective   /68   Pulse 79   Temp (!) 96 8 °F (36 °C) (Tympanic)   Resp 16   Ht 5' 3" (1 6 m)   Wt 62 6 kg (138 lb)   LMP 02/11/2021 (Approximate)   SpO2 97%   BMI 24 45 kg/m²        Physical Exam     Physical Exam  Vitals signs and nursing note reviewed  Constitutional:       General: She is not in acute distress  Appearance: She is not ill-appearing  Musculoskeletal:      Left hand: She exhibits decreased range of motion, tenderness, bony tenderness and swelling  She exhibits normal capillary refill, no deformity and no laceration  Normal sensation noted  Normal strength noted  Hands:       Comments:   Patient is unable to fully flex her 3rd finger due to pain  Neurological:      Mental Status: She is alert

## 2021-04-16 ENCOUNTER — OFFICE VISIT (OUTPATIENT)
Dept: OBGYN CLINIC | Facility: CLINIC | Age: 21
End: 2021-04-16
Payer: COMMERCIAL

## 2021-04-16 VITALS
WEIGHT: 143.8 LBS | SYSTOLIC BLOOD PRESSURE: 100 MMHG | HEART RATE: 84 BPM | DIASTOLIC BLOOD PRESSURE: 60 MMHG | HEIGHT: 63 IN | BODY MASS INDEX: 25.48 KG/M2

## 2021-04-16 DIAGNOSIS — Z30.09 ENCOUNTER FOR GENERAL COUNSELING AND ADVICE ON CONTRACEPTIVE MANAGEMENT: Primary | ICD-10-CM

## 2021-04-16 DIAGNOSIS — Z30.011 ORAL CONTRACEPTION INITIAL PRESCRIPTION: ICD-10-CM

## 2021-04-16 PROCEDURE — 3008F BODY MASS INDEX DOCD: CPT | Performed by: NURSE PRACTITIONER

## 2021-04-16 PROCEDURE — 1036F TOBACCO NON-USER: CPT | Performed by: NURSE PRACTITIONER

## 2021-04-16 PROCEDURE — 99212 OFFICE O/P EST SF 10 MIN: CPT | Performed by: NURSE PRACTITIONER

## 2021-04-16 RX ORDER — DROSPIRENONE AND ETHINYL ESTRADIOL 0.02-3(28)
1 KIT ORAL DAILY
Qty: 84 TABLET | Refills: 1 | Status: SHIPPED | OUTPATIENT
Start: 2021-04-16 | End: 2021-04-21

## 2021-04-16 NOTE — PROGRESS NOTES
Assessment/Plan:    1  Encounter for general counseling and advice on contraceptive management  Discontinued POP due to n/v   Reviewed remaining prog only options  She prefers to try another MING again  Her migraines are without aura therefore this is an option  Rx sent for Charlotte Wayne as she requests a pill to help reduce acne  Encouraged condom use  RV 3 mo for yearly/ pill check  2  Oral contraception initial prescription    - drospirenone-ethinyl estradiol (JOHANNA) 3-0 02 MG per tablet; Take 1 tablet by mouth daily  Dispense: 84 tablet; Refill: 1      Subjective:      Patient ID: Derek Stewart is a 23 y o  female  HPI   PROBLEM VISIT  CC: birth control    22 yo  presents to discuss birth control  Seen 2020 for visit regarding birth control  She was considering Nexplanon at that time however chose progesterone only pill instead  States that she stopped taking it after a couple months due to nausea/ vomiting  Not currently sexually active  Hx of migraines, w/o aura, however exacerbated by MING use in the past       The following portions of the patient's history were reviewed and updated as appropriate: allergies, current medications, past family history, past medical history, past social history, past surgical history and problem list     Review of Systems   Constitutional: Negative for chills and fever  Gastrointestinal: Positive for nausea and vomiting  On prog only pill   Genitourinary: Negative  Objective:    /60 (BP Location: Left arm, Patient Position: Sitting, Cuff Size: Standard)   Pulse 84   Ht 5' 3" (1 6 m)   Wt 65 2 kg (143 lb 12 8 oz)   LMP 2021   BMI 25 47 kg/m²      Physical Exam  Constitutional:       General: She is not in acute distress  Appearance: Normal appearance  She is normal weight  She is not ill-appearing  HENT:      Head: Normocephalic and atraumatic  Eyes:      Pupils: Pupils are equal, round, and reactive to light  Pulmonary:      Effort: Pulmonary effort is normal    Skin:     General: Skin is warm and dry  Neurological:      General: No focal deficit present  Mental Status: She is alert and oriented to person, place, and time  Psychiatric:         Mood and Affect: Mood normal          Behavior: Behavior normal          Thought Content:  Thought content normal          Judgment: Judgment normal

## 2021-04-21 ENCOUNTER — TELEPHONE (OUTPATIENT)
Dept: OBGYN CLINIC | Facility: CLINIC | Age: 21
End: 2021-04-21

## 2021-04-21 DIAGNOSIS — Z30.011 ORAL CONTRACEPTION INITIAL PRESCRIPTION: Primary | ICD-10-CM

## 2021-04-21 RX ORDER — DROSPIRENONE AND ETHINYL ESTRADIOL 0.02-3(28)
1 KIT ORAL DAILY
Qty: 28 TABLET | Refills: 4 | Status: SHIPPED | OUTPATIENT
Start: 2021-04-21

## 2021-04-21 NOTE — TELEPHONE ENCOUNTER
BCP prescribed was not covered by insurance  Patient called insurance  They will cover Jolynn Peralta, or [facility]  Please send a new script to [facility] on [address]  Patient would prefer Latanya Rival

## 2021-06-10 NOTE — PROGRESS NOTES
Daily Note     Today's date: 2019  Patient name: Bryon Mercado  : 2000  MRN: [id number]  Referring provider: Farooq Puente PA-C  Dx:   Encounter Diagnosis   Name Primary?  Concussion without loss of consciousness, subsequent encounter Yes                  Subjective: "I'm going to need to get one of those blue occluders now that I'm back at work and on the computer a lot "      Objective: See treatment below  HA 8/10 throughout session  Dimmed OH lights to reduce visual stimuli and prevent increase in HA  Word circles in stance requiring head turns to L to locate corresponding foam ABC blocks and using OH reach to arrange blocks on OH surface, with focus on near/far saccadic movements and visual scanning with visual clutter  Abbreviations word search seated at slantboard for board-to-table copy  Assessment: Tolerated treatment well  Pt c/o mod dizziness during head turns in stance, that decreased once seated  100% accuracy completing tasks with slow pace  Plan: Continue skilled OT per POC        INTERVENTION COMMENTS:  Diagnosis: Concussion without loss of consciousness, subsequent encounter [S06 [id number]]  Precautions: NA  FOTO:  6 of 24 visits, PN due 3/4 Who is calling:  Patient   Reason for Call:  Patient was checking the status of her pain medication. Please call her back if Julien Garnica MD will prescribe her a pain medication. Please let her know if she needs to be seen.  Date of last appointment with primary care: 8/5/20  Okay to leave a detailed message: No

## 2024-06-20 NOTE — TELEPHONE ENCOUNTER
Anesthesia Evaluation     Patient summary reviewed and Nursing notes reviewed   NPO Solid Status: > 6 hours  NPO Liquid Status: > 2 hours           Airway   Mallampati: II  TM distance: >3 FB  Neck ROM: full  Dental      Pulmonary     breath sounds clear to auscultation  (+) ,sleep apnea  Cardiovascular - negative cardio ROS    Rhythm: regular  Rate: normal        Neuro/Psych  (+) psychiatric history Anxiety and ADHD  GI/Hepatic/Renal/Endo    (+) morbid obesity, GERD, diabetes mellitus gestational poorly controlled    Musculoskeletal (-) negative ROS    Abdominal    Substance History - negative use     OB/GYN    (+) Pregnant    Comment: Urgent  for macrosomia, polyhydramnios and poorly controlled GDM.      Other                    Anesthesia Plan    ASA 3 - emergent     ITN and spinal       Anesthetic plan, risks, benefits, and alternatives have been provided, discussed and informed consent has been obtained with: patient.    Use of blood products discussed with patient .      CODE STATUS:    Level Of Support Discussed With: Patient  Code Status (Patient has no pulse and is not breathing): CPR (Attempt to Resuscitate)  Medical Interventions (Patient has pulse or is breathing): Full Support       FYI:

## 2025-03-27 NOTE — TELEPHONE ENCOUNTER
Patient called back in to f/u, also states that her job is requesting a medical accommodation form to be filled out, states this is regarding restrictions regarding her schedule  She will be dropping this off at the [place] office  Pt made aware of the fee associated with forms  Patient is scheduled for colonoscopy with Dr. Abraham on 5/19/25. Patient is on [employer]. Requesting permission to hold for 3 days prior to procedure. Please advise.